# Patient Record
Sex: FEMALE | Race: WHITE | Employment: OTHER | URBAN - METROPOLITAN AREA
[De-identification: names, ages, dates, MRNs, and addresses within clinical notes are randomized per-mention and may not be internally consistent; named-entity substitution may affect disease eponyms.]

---

## 2017-06-15 ENCOUNTER — LAB REQUISITION (OUTPATIENT)
Dept: LAB | Facility: HOSPITAL | Age: 82
End: 2017-06-15
Attending: FAMILY MEDICINE
Payer: MEDICARE

## 2017-06-15 DIAGNOSIS — Z00.00 ENCOUNTER FOR GENERAL ADULT MEDICAL EXAMINATION WITHOUT ABNORMAL FINDINGS: ICD-10-CM

## 2017-06-15 PROCEDURE — 85025 COMPLETE CBC W/AUTO DIFF WBC: CPT | Performed by: FAMILY MEDICINE

## 2017-06-15 PROCEDURE — 80053 COMPREHEN METABOLIC PANEL: CPT | Performed by: FAMILY MEDICINE

## 2017-06-16 ENCOUNTER — APPOINTMENT (OUTPATIENT)
Dept: CT IMAGING | Facility: HOSPITAL | Age: 82
DRG: 389 | End: 2017-06-16
Attending: STUDENT IN AN ORGANIZED HEALTH CARE EDUCATION/TRAINING PROGRAM
Payer: MEDICARE

## 2017-06-16 ENCOUNTER — HOSPITAL ENCOUNTER (INPATIENT)
Facility: HOSPITAL | Age: 82
LOS: 8 days | Discharge: NURSING, REHAB OR LONG-TERM CARE | DRG: 389 | End: 2017-06-24
Attending: STUDENT IN AN ORGANIZED HEALTH CARE EDUCATION/TRAINING PROGRAM | Admitting: HOSPITALIST
Payer: MEDICARE

## 2017-06-16 ENCOUNTER — APPOINTMENT (OUTPATIENT)
Dept: GENERAL RADIOLOGY | Facility: HOSPITAL | Age: 82
DRG: 389 | End: 2017-06-16
Attending: STUDENT IN AN ORGANIZED HEALTH CARE EDUCATION/TRAINING PROGRAM
Payer: MEDICARE

## 2017-06-16 DIAGNOSIS — K56.609 SBO (SMALL BOWEL OBSTRUCTION) (HCC): Primary | ICD-10-CM

## 2017-06-16 DIAGNOSIS — E87.1 HYPONATREMIA: ICD-10-CM

## 2017-06-16 DIAGNOSIS — J44.9 CHRONIC OBSTRUCTIVE PULMONARY DISEASE, UNSPECIFIED COPD TYPE (HCC): ICD-10-CM

## 2017-06-16 DIAGNOSIS — R11.2 NAUSEA AND VOMITING IN ADULT: ICD-10-CM

## 2017-06-16 PROCEDURE — 96361 HYDRATE IV INFUSION ADD-ON: CPT

## 2017-06-16 PROCEDURE — 82962 GLUCOSE BLOOD TEST: CPT

## 2017-06-16 PROCEDURE — 83036 HEMOGLOBIN GLYCOSYLATED A1C: CPT | Performed by: PHYSICIAN ASSISTANT

## 2017-06-16 PROCEDURE — 85025 COMPLETE CBC W/AUTO DIFF WBC: CPT | Performed by: HOSPITALIST

## 2017-06-16 PROCEDURE — 83735 ASSAY OF MAGNESIUM: CPT | Performed by: HOSPITALIST

## 2017-06-16 PROCEDURE — 74176 CT ABD & PELVIS W/O CONTRAST: CPT | Performed by: STUDENT IN AN ORGANIZED HEALTH CARE EDUCATION/TRAINING PROGRAM

## 2017-06-16 PROCEDURE — 84145 PROCALCITONIN (PCT): CPT | Performed by: HOSPITALIST

## 2017-06-16 PROCEDURE — 83935 ASSAY OF URINE OSMOLALITY: CPT | Performed by: INTERNAL MEDICINE

## 2017-06-16 PROCEDURE — 97530 THERAPEUTIC ACTIVITIES: CPT

## 2017-06-16 PROCEDURE — 82570 ASSAY OF URINE CREATININE: CPT | Performed by: INTERNAL MEDICINE

## 2017-06-16 PROCEDURE — 99285 EMERGENCY DEPT VISIT HI MDM: CPT

## 2017-06-16 PROCEDURE — 94640 AIRWAY INHALATION TREATMENT: CPT

## 2017-06-16 PROCEDURE — 97530 THERAPEUTIC ACTIVITIES: CPT | Performed by: PHYSICAL THERAPIST

## 2017-06-16 PROCEDURE — 80053 COMPREHEN METABOLIC PANEL: CPT | Performed by: STUDENT IN AN ORGANIZED HEALTH CARE EDUCATION/TRAINING PROGRAM

## 2017-06-16 PROCEDURE — 80053 COMPREHEN METABOLIC PANEL: CPT | Performed by: INTERNAL MEDICINE

## 2017-06-16 PROCEDURE — 84443 ASSAY THYROID STIM HORMONE: CPT | Performed by: INTERNAL MEDICINE

## 2017-06-16 PROCEDURE — 93005 ELECTROCARDIOGRAM TRACING: CPT

## 2017-06-16 PROCEDURE — 97162 PT EVAL MOD COMPLEX 30 MIN: CPT | Performed by: PHYSICAL THERAPIST

## 2017-06-16 PROCEDURE — 85025 COMPLETE CBC W/AUTO DIFF WBC: CPT | Performed by: STUDENT IN AN ORGANIZED HEALTH CARE EDUCATION/TRAINING PROGRAM

## 2017-06-16 PROCEDURE — 93010 ELECTROCARDIOGRAM REPORT: CPT

## 2017-06-16 PROCEDURE — 71010 XR CHEST AP PORTABLE  (CPT=71010): CPT | Performed by: STUDENT IN AN ORGANIZED HEALTH CARE EDUCATION/TRAINING PROGRAM

## 2017-06-16 PROCEDURE — 84484 ASSAY OF TROPONIN QUANT: CPT | Performed by: STUDENT IN AN ORGANIZED HEALTH CARE EDUCATION/TRAINING PROGRAM

## 2017-06-16 PROCEDURE — 96374 THER/PROPH/DIAG INJ IV PUSH: CPT

## 2017-06-16 PROCEDURE — 84100 ASSAY OF PHOSPHORUS: CPT | Performed by: INTERNAL MEDICINE

## 2017-06-16 PROCEDURE — 84550 ASSAY OF BLOOD/URIC ACID: CPT | Performed by: INTERNAL MEDICINE

## 2017-06-16 PROCEDURE — 84133 ASSAY OF URINE POTASSIUM: CPT | Performed by: INTERNAL MEDICINE

## 2017-06-16 PROCEDURE — 97165 OT EVAL LOW COMPLEX 30 MIN: CPT

## 2017-06-16 PROCEDURE — 83930 ASSAY OF BLOOD OSMOLALITY: CPT | Performed by: INTERNAL MEDICINE

## 2017-06-16 PROCEDURE — 83735 ASSAY OF MAGNESIUM: CPT | Performed by: STUDENT IN AN ORGANIZED HEALTH CARE EDUCATION/TRAINING PROGRAM

## 2017-06-16 PROCEDURE — 84300 ASSAY OF URINE SODIUM: CPT | Performed by: INTERNAL MEDICINE

## 2017-06-16 PROCEDURE — 94644 CONT INHLJ TX 1ST HOUR: CPT

## 2017-06-16 PROCEDURE — 84100 ASSAY OF PHOSPHORUS: CPT | Performed by: STUDENT IN AN ORGANIZED HEALTH CARE EDUCATION/TRAINING PROGRAM

## 2017-06-16 PROCEDURE — 71020 XR CHEST PA + LAT CHEST (CPT=71020): CPT | Performed by: STUDENT IN AN ORGANIZED HEALTH CARE EDUCATION/TRAINING PROGRAM

## 2017-06-16 PROCEDURE — 83690 ASSAY OF LIPASE: CPT | Performed by: STUDENT IN AN ORGANIZED HEALTH CARE EDUCATION/TRAINING PROGRAM

## 2017-06-16 RX ORDER — IPRATROPIUM BROMIDE AND ALBUTEROL SULFATE 2.5; .5 MG/3ML; MG/3ML
3 SOLUTION RESPIRATORY (INHALATION) ONCE
Status: COMPLETED | OUTPATIENT
Start: 2017-06-16 | End: 2017-06-16

## 2017-06-16 RX ORDER — MORPHINE SULFATE 2 MG/ML
1 INJECTION, SOLUTION INTRAMUSCULAR; INTRAVENOUS EVERY 2 HOUR PRN
Status: DISCONTINUED | OUTPATIENT
Start: 2017-06-16 | End: 2017-06-24

## 2017-06-16 RX ORDER — FUROSEMIDE 40 MG/1
40 TABLET ORAL 2 TIMES DAILY
Status: ON HOLD | COMMUNITY
End: 2017-06-24

## 2017-06-16 RX ORDER — IPRATROPIUM BROMIDE AND ALBUTEROL SULFATE 2.5; .5 MG/3ML; MG/3ML
3 SOLUTION RESPIRATORY (INHALATION) EVERY 4 HOURS PRN
Status: DISCONTINUED | OUTPATIENT
Start: 2017-06-16 | End: 2017-06-24

## 2017-06-16 RX ORDER — MORPHINE SULFATE 2 MG/ML
2 INJECTION, SOLUTION INTRAMUSCULAR; INTRAVENOUS EVERY 2 HOUR PRN
Status: DISCONTINUED | OUTPATIENT
Start: 2017-06-16 | End: 2017-06-24

## 2017-06-16 RX ORDER — INSULIN ASPART 100 [IU]/ML
8 INJECTION, SOLUTION INTRAVENOUS; SUBCUTANEOUS 3 TIMES DAILY
Status: ON HOLD | COMMUNITY
End: 2017-06-24

## 2017-06-16 RX ORDER — ACETAMINOPHEN 325 MG/1
650 TABLET ORAL EVERY 6 HOURS PRN
COMMUNITY

## 2017-06-16 RX ORDER — METHYLPREDNISOLONE SODIUM SUCCINATE 125 MG/2ML
125 INJECTION, POWDER, LYOPHILIZED, FOR SOLUTION INTRAMUSCULAR; INTRAVENOUS ONCE
Status: COMPLETED | OUTPATIENT
Start: 2017-06-16 | End: 2017-06-16

## 2017-06-16 RX ORDER — ONDANSETRON 2 MG/ML
4 INJECTION INTRAMUSCULAR; INTRAVENOUS EVERY 6 HOURS PRN
Status: DISCONTINUED | OUTPATIENT
Start: 2017-06-16 | End: 2017-06-24

## 2017-06-16 RX ORDER — BRIMONIDINE TARTRATE 2 MG/ML
1 SOLUTION/ DROPS OPHTHALMIC 2 TIMES DAILY
Status: DISCONTINUED | OUTPATIENT
Start: 2017-06-16 | End: 2017-06-24

## 2017-06-16 RX ORDER — DEXTROSE MONOHYDRATE 25 G/50ML
50 INJECTION, SOLUTION INTRAVENOUS
Status: DISCONTINUED | OUTPATIENT
Start: 2017-06-16 | End: 2017-06-24

## 2017-06-16 RX ORDER — HEPARIN SODIUM 5000 [USP'U]/ML
5000 INJECTION, SOLUTION INTRAVENOUS; SUBCUTANEOUS EVERY 8 HOURS SCHEDULED
Status: DISCONTINUED | OUTPATIENT
Start: 2017-06-16 | End: 2017-06-24

## 2017-06-16 RX ORDER — LOPERAMIDE HYDROCHLORIDE 2 MG/1
2 CAPSULE ORAL 4 TIMES DAILY PRN
Status: ON HOLD | COMMUNITY
End: 2017-06-24

## 2017-06-16 RX ORDER — SODIUM CHLORIDE 9 MG/ML
INJECTION, SOLUTION INTRAVENOUS CONTINUOUS
Status: ACTIVE | OUTPATIENT
Start: 2017-06-16 | End: 2017-06-17

## 2017-06-16 RX ORDER — SODIUM CHLORIDE 9 MG/ML
INJECTION, SOLUTION INTRAVENOUS CONTINUOUS
Status: DISCONTINUED | OUTPATIENT
Start: 2017-06-16 | End: 2017-06-18

## 2017-06-16 RX ORDER — IPRATROPIUM BROMIDE AND ALBUTEROL SULFATE 2.5; .5 MG/3ML; MG/3ML
SOLUTION RESPIRATORY (INHALATION)
Status: DISPENSED
Start: 2017-06-16 | End: 2017-06-16

## 2017-06-16 RX ORDER — SODIUM CHLORIDE 9 MG/ML
1000 INJECTION, SOLUTION INTRAVENOUS ONCE
Status: COMPLETED | OUTPATIENT
Start: 2017-06-16 | End: 2017-06-16

## 2017-06-16 RX ORDER — ONDANSETRON 2 MG/ML
4 INJECTION INTRAMUSCULAR; INTRAVENOUS EVERY 4 HOURS PRN
Status: DISCONTINUED | OUTPATIENT
Start: 2017-06-16 | End: 2017-06-16

## 2017-06-16 RX ORDER — ATORVASTATIN CALCIUM 10 MG/1
10 TABLET, FILM COATED ORAL NIGHTLY
Status: ON HOLD | COMMUNITY
End: 2017-06-24

## 2017-06-16 RX ORDER — MORPHINE SULFATE 4 MG/ML
4 INJECTION, SOLUTION INTRAMUSCULAR; INTRAVENOUS EVERY 2 HOUR PRN
Status: DISCONTINUED | OUTPATIENT
Start: 2017-06-16 | End: 2017-06-24

## 2017-06-16 NOTE — CONSULTS
[facility] - Nephrology  Report of Consultation    Gerard Parviz Patient Status:  Inpatient    1934 MRN [id number]   [facility] 5NW-A Attending Doni Hastings, [address] [facility] Day # 0 PCP Yeimi Degroot MD     Reason for cons HISTORY  2003    Comment right breast lumpectomy    COLONOSCOPY  4/28/11= Polyps (TA)    Comment Repeat PRN.       RADIATION RIGHT  1992    LUMPECTOMY RIGHT  1992     Family History   Problem Relation Age of Onset   • Heart Attack Mother       reports that furosemide 40 MG Oral Tab Take 40 mg by mouth 2 (two) times daily. Disp:  Rfl:     atorvastatin 10 MG Oral Tab Take 10 mg by mouth nightly. Disp:  Rfl:     acetaminophen 325 MG Oral Tab Take 650 mg by mouth every 6 (six) hours as needed for Pain.  Disp: Disp: 30 capsule Rfl: 0 Taking   aspirin 81 MG Oral Tab Take 81 mg by mouth daily. Indications: Heart Attack Disp:  Rfl:  6/15/2017 at 0800   brimonidine Tartrate (ALPHAGAN) 0.2 % Ophthalmic Solution Place 1 drop into both eyes 2 (two) times daily.  Disp: 06/16/2017   ALB 3.2* 06/16/2017   * 06/16/2017   * 06/16/2017   K 3.9 06/16/2017   K 3.9 06/16/2017   CL 78* 06/16/2017   CL 78* 06/16/2017   CO2 23.0 06/16/2017   CO2 23.0 06/16/2017       Lab Results  Component Value Date   WBC 8.5 06/16/201 depletion. IVF     pSBO - mgmt as per surgery     HTN - hold BP meds, running lower than her normal at present     [facility] - judicious use of IVF and will avoid being overly aggressive.  Compensated at present and dry     Juliocesar Richards DO  [facility]

## 2017-06-16 NOTE — PROGRESS NOTES
NURSING ADMISSION NOTE      Patient admitted via Cart  Oriented to room. Safety precautions initiated. Bed in low position. Call light in reach. Admission navigator complete. Pt aox2, maintaining O2 sats on RA, wheezy. VSS. Denies pain.  NG tub

## 2017-06-16 NOTE — H&P
[address] [facility] Patient Status:  Inpatient    1934 MRN [id number]   [facility] 5NW-A Attending Santo Cline, DO   Hosp Day # 0 PCP Yin Noriega MD     Cc: vomiting    History of Present [place] OTHER SURGICAL HISTORY  2003    Comment right breast lumpectomy    COLONOSCOPY  4/28/11= Polyps (TA)    Comment Repeat PRN.       RADIATION RIGHT  1992    LUMPECTOMY RIGHT  1992     Family History   Problem Relation Age of Onset   • Heart Attack Mother FLEXPEN) 100 UNIT/ML Subcutaneous Solution Pen-injector Inject 2 Units into the skin 3 (three) times daily before meals. (Patient taking differently: Inject 2 Units into the skin 3 (three) times daily before meals.  SLIDING SCALE    150-200=2 units, 201-250 comfortable  HEENT: moist mucous membranes.  PERRL  Lungs: clear to auscultation bilaterally, no wheeze  Heart:  regular rate and rhythm, no murmur   Abdomen: soft, nontender to palpation,  bowel sounds hypokinetic  Extremities: no calf tenderness, no pedal 2.7 x 2.3 cm. This overall appearance could be related to chronic pancreatitis with multiple pseudocysts or cystic pancreatic neoplasm is not entirely excluded. Clinical correlation recommended.   3. Multiple stable chronic compression deformities in the sp CAD, T2DM, breast ca, HTN, HL and recent admission for SBO and possible Gram neg PNA in Aug 2016 per my review of records who is admitted with complaint of sudden onset of vomiting and inability to keep any PO since yesterday.   She denies abd pain or cramp

## 2017-06-16 NOTE — PAYOR COMM NOTE
--------------  ADMISSION REVIEW     Payor: [facility] MEDICARE ADV PPO  Subscriber #:  [id number]  Authorization Number: N/A    Admit date: 6/16/2017  1:32 AM       Admitting Physician: Kings Orr MD  Attending Physician:  Mckenzie Mckeon DO  Primary Care P mmHg  Pulse 100  Temp(Src) 97.2 °F (36.2 °C)  Resp 22  Ht 147.3 cm (4' 10\")  Wt 49.896 kg  BMI 23.00 kg/m2  SpO2 100%        Physical Exam    Constitutional: oriented to person, place, and time, appears well-developed and well-nourished.    HENT:   Head: N improvement. Patient's significant hyponatremia in the absence of neurologic changes was addressed with normal saline IV fluid bolus with 1 L followed by infusion of 100 mL's per hour. Patient was kept n.p.o. pending further evaluation.     Abdominal pa    pancreatitis with multiple pseudocysts or cystic pancreatic neoplasm is not entirely excluded. Clinical correlation recommended.      3.  Multiple stable chronic compression deformities in the spine                    MEDICATIONS ADMINISTERED IN LAST 1 D time.    NG tube, bowel rest, IV fluids, IV pain medication prn  Continue with serial abdominal exams, if patients condition worsens will proceed with surgical management.     Obstructive series in the morning tomorrow.

## 2017-06-16 NOTE — CM/SW NOTE
Admitted from [facility], appears to be a LTC resident. Left voicemil for pt's daughter, Veena Falcon [phone], requesting call back to confirm dc plan will be to return at [place].     Ray Mak RN/CM  [phone]

## 2017-06-16 NOTE — PLAN OF CARE
Impaired Activities of Daily Living    • Achieve highest/safest level of independence in self care Progressing        Impaired Functional Mobility    • Achieve highest/safest level of mobility/gait Progressing        Patient/Family Goals    • Patient/Famil aortic atherosclerosis (HCC)     DM (diabetes mellitus), type 2, uncontrolled ([address]     BOB (dyspnea on exertion)     Kyphosis of thoracic region, unspecified kyphosis type     Compression fracture of fourth lumbar vertebra with delayed healing     Darrick

## 2017-06-16 NOTE — CONSULTS
[facility]  Report of Consultation    Ladarius Perez Patient Status:  Inpatient    1934 MRN [id number]   [facility] 5NW-A Attending Santo Cline, [address] [facility] Day # 0 PCP Yin Noriega MD     Reason for Consultation:    Iris Carmona TONSILLECTOMY      Comment as child    OTHER SURGICAL HISTORY  2003    Comment colon resection    OTHER SURGICAL HISTORY  2003    Comment lymph node exc    OTHER SURGICAL HISTORY  1990's    Comment cardiac stents    OTHER SURGICAL HISTORY  2003    Comment medications on file prior to encounter. Current Outpatient Prescriptions on File Prior to Encounter:  Losartan Potassium 25 MG Oral Tab Take 2 tablets (50 mg total) by mouth daily.  Disp: 30 tablet Rfl: 0   ipratropium-albuterol 0.5-2.5 (3) MG/3ML Inhalati Strip CHECK GLUCOSE THREE TIMES DAILY, DX E11.9 Disp: 100 strip Rfl: 0   Escitalopram Oxalate (LEXAPRO) 10 MG Oral Tab Take 10 mg by mouth daily.  Disp:  Rfl:    Insulin Pen Needle (NOVOFINE) 30G X 8 MM Does not apply Misc Inject three times daily as direct vomiting  TECHNIQUE:  Unenhanced multislice CT scanning was performed from the dome of the diaphragm to the pubic symphysis. Dose reduction techniques were used.  Dose information is transmitted to the [facility] [employer] of Radiology) [facility] Estes [facility] or pelvis BONES:  Osteopenia. Degenerative changes in the spine, sacral joints, and hips. Old fracture deformities of the left pubic rami.  Changes of previous vertebroplasty is with extensive chronic appearing compression deformi anemia     History of colon cancer     Stasis dermatitis of both legs     Bilateral edema of lower extremity     Diabetic polyneuropathy associated with type 2 diabetes mellitus (HCC)     Smoking     Lung nodules     Compression fracture of body of thoraci

## 2017-06-16 NOTE — ED INITIAL ASSESSMENT (HPI)
Pt coming from [facility] in [place]. Pt vomited 3 times today. Last emesis at 2030. Labs drawn this event. Nursing home reports low sodium of 115. Pt A&OX2 which is pt's baseline.

## 2017-06-16 NOTE — ED PROVIDER NOTES
Patient Seen in: [facility] Emergency Department    History   Patient presents with:  Nausea/Vomiting/Diarrhea (gastrointestinal)  Abnormal Result (metabolic, cardiac)    Stated Complaint:     HPI    Patient is an 80-year-old female nursing home res Medications :   Losartan Potassium 25 MG Oral Tab,  Take 2 tablets (50 mg total) by mouth daily. ipratropium-albuterol 0.5-2.5 (3) MG/3ML Inhalation Solution,  Take 3 mL by nebulization.    acetaminophen 325 MG Oral Tab,  Take 650 mg by mouth every 0.50  Years: 64      Smokeless Status: Never Used                        Alcohol Use: No                Review of Systems    Positive for stated complaint:   Other systems are as noted in HPI. Constitutional and vital signs reviewed.       All other system following:     Lipase 38 (*)     All other components within normal limits   CBC W/ DIFFERENTIAL - Abnormal; Notable for the following:     Neutrophil Absolute Prelim 7.52 (*)     Neutrophil Absolute 7.52 (*)     Lymphocyte Absolute 0.33 (*)     All other performed. Patient's wheezing continued to worsen despite receiving 3 DuoNeb treatments, therefore continuous albuterol 10 mg was administered along with steroids.     Case was also discussed in detail with the admitting physician, Dr. Nancy Fernandez, who agre

## 2017-06-16 NOTE — OCCUPATIONAL THERAPY NOTE
OCCUPATIONAL THERAPY QUICK EVALUATION - INPATIENT    Room Number: 475/006-R  Evaluation Date: 6/16/2017     Type of Evaluation: Quick Eval  Presenting Problem: SBO, hyponatremia, N/V and COPD     Physician Order: IP Consult to Occupational Therapy  Reason right breast lumpectomy    COLONOSCOPY  4/28/11= Polyps (TA)    Comment Repeat PRN.       RADIATION RIGHT  1992    LUMPECTOMY RIGHT  1992       HOME SITUATION  Type of Home: Skilled nursing facility [facility] )  Home Layout: One level  Lives With: assistance  Sit to Stand: Maximum assistance (minx1, modx1 )    Skilled Therapy Provided: Pt was found in bed in a semi-supine position. Pt required total A for doffing and donning socks. Pt performed supine>sit EOB with max assistance.  Pt performed sit>st

## 2017-06-16 NOTE — PHYSICAL THERAPY NOTE
PHYSICAL THERAPY QUICK EVALUATION - INPATIENT    Room Number: 851/453-J  Evaluation Date: 6/16/2017  Presenting Problem: small bowel obstruction  Physician Order: PT Eval and Treat    History: Patient was admitted from [facility] for small bowel ob to SOB, COPD and hyponatremia. Pt indicates that she is not sure if she can walk. OBJECTIVE  Precautions:  Other (Comment) (NG to suction )  Fall Risk: High fall risk    WEIGHT BEARING RESTRICTION  Weight Bearing Restriction: None                PAIN AS maintaining good posture as pt tends to stoop forward with ambulation. Pt needs intermittent rest with ambulation. She requires chair follow up with ambulation. She was able to ambulate with maximal assistance.      Patient End of Session: Up in chair    AS

## 2017-06-17 ENCOUNTER — APPOINTMENT (OUTPATIENT)
Dept: GENERAL RADIOLOGY | Facility: HOSPITAL | Age: 82
DRG: 389 | End: 2017-06-17
Attending: HOSPITALIST
Payer: MEDICARE

## 2017-06-17 ENCOUNTER — APPOINTMENT (OUTPATIENT)
Dept: GENERAL RADIOLOGY | Facility: HOSPITAL | Age: 82
DRG: 389 | End: 2017-06-17
Attending: PHYSICIAN ASSISTANT
Payer: MEDICARE

## 2017-06-17 PROCEDURE — 87798 DETECT AGENT NOS DNA AMP: CPT | Performed by: HOSPITALIST

## 2017-06-17 PROCEDURE — 82962 GLUCOSE BLOOD TEST: CPT

## 2017-06-17 PROCEDURE — 85025 COMPLETE CBC W/AUTO DIFF WBC: CPT | Performed by: HOSPITALIST

## 2017-06-17 PROCEDURE — 80048 BASIC METABOLIC PNL TOTAL CA: CPT | Performed by: INTERNAL MEDICINE

## 2017-06-17 PROCEDURE — 87581 M.PNEUMON DNA AMP PROBE: CPT | Performed by: HOSPITALIST

## 2017-06-17 PROCEDURE — 74020 XR ABDOMEN, OBSTRUCTIVE SERIES (CPT=74020): CPT | Performed by: PHYSICIAN ASSISTANT

## 2017-06-17 PROCEDURE — 0CJS8ZZ INSPECTION OF LARYNX, VIA NATURAL OR ARTIFICIAL OPENING ENDOSCOPIC: ICD-10-PCS | Performed by: OTOLARYNGOLOGY

## 2017-06-17 PROCEDURE — 94640 AIRWAY INHALATION TREATMENT: CPT

## 2017-06-17 PROCEDURE — 87633 RESP VIRUS 12-25 TARGETS: CPT | Performed by: HOSPITALIST

## 2017-06-17 PROCEDURE — 87486 CHLMYD PNEUM DNA AMP PROBE: CPT | Performed by: HOSPITALIST

## 2017-06-17 PROCEDURE — 70360 X-RAY EXAM OF NECK: CPT | Performed by: HOSPITALIST

## 2017-06-17 PROCEDURE — 71010 XR CHEST AP PORTABLE  (CPT=71010): CPT | Performed by: HOSPITALIST

## 2017-06-17 RX ORDER — GUAIFENESIN 600 MG
600 TABLET, EXTENDED RELEASE 12 HR ORAL 2 TIMES DAILY
Status: DISCONTINUED | OUTPATIENT
Start: 2017-06-17 | End: 2017-06-24

## 2017-06-17 RX ORDER — IPRATROPIUM BROMIDE AND ALBUTEROL SULFATE 2.5; .5 MG/3ML; MG/3ML
3 SOLUTION RESPIRATORY (INHALATION)
Status: DISCONTINUED | OUTPATIENT
Start: 2017-06-17 | End: 2017-06-18

## 2017-06-17 RX ORDER — POTASSIUM CHLORIDE 20 MEQ/1
40 TABLET, EXTENDED RELEASE ORAL ONCE
Status: COMPLETED | OUTPATIENT
Start: 2017-06-17 | End: 2017-06-17

## 2017-06-17 RX ORDER — METHYLPREDNISOLONE SODIUM SUCCINATE 125 MG/2ML
60 INJECTION, POWDER, LYOPHILIZED, FOR SOLUTION INTRAMUSCULAR; INTRAVENOUS EVERY 8 HOURS
Status: DISCONTINUED | OUTPATIENT
Start: 2017-06-17 | End: 2017-06-17

## 2017-06-17 RX ORDER — LORAZEPAM 2 MG/ML
0.5 INJECTION INTRAMUSCULAR ONCE
Status: COMPLETED | OUTPATIENT
Start: 2017-06-17 | End: 2017-06-17

## 2017-06-17 NOTE — PLAN OF CARE
Impaired Activities of Daily Living    • Achieve highest/safest level of independence in self care Progressing        Impaired Functional Mobility    • Achieve highest/safest level of mobility/gait Progressing        Impaired Functional Mobility    • Achie

## 2017-06-17 NOTE — PROGRESS NOTES
[facility] Hospitalist Progress Note     PCP: Bernabe Orr MD    CC:  Follow up    SUBJECTIVE:  Pt sitting up in chair, son at bedside. Overnight, pt with episode of wet sounding cough-- CXR without infectious process. NG pulled overnight.       Pt repo 78*  78*  84*   CO2  27.0  28.0  23.0  23.0  29.0   BUN  16  16  16  16  17   CREATSERUM  1.31*  1.36*  1.50*  1.50*  1.24*   CA  9.0  8.8  8.0*  8.0*  8.0*   MG   --   1.7  1.7   --    PHOS   --    --   3.2   --    GLU  225*  205*  302*  302*  153* steroids  -mucinex, RVP ordered  -IS, pulmonary toilet, flutter valve    **leukocytosis-suspect reactive    **anemia- suspect reactive and secondary to CKD, monitor    # T2DM with associated peripheral neuropathy  - Ha1c of 6.0% on 4/15--> re check  - hold

## 2017-06-17 NOTE — PLAN OF CARE
Radiology called and said that chest xray looked clear, but the xray of her neck showed severe kyphosis and that the way the NG tube is sitting due to her kyphosis is causing her trachea to be narrowed.  He suggested that the NG tube be removed or that if s

## 2017-06-17 NOTE — PROGRESS NOTES
[address]  Nephrology Progress Note    Emanuel Lebron Attending:  Damon Oshea, *       SUBJECTIVE:     Overall better - NG is out   Taking in clears   Issues with stridor today   Na improving     PHYSICAL EXAM:     Vital Signs: /76 06/17/2017       Lab Results  Component Value Date   CREUR 64.60 06/16/2017       Lab Results  Component Value Date   COLORUR Yellow 08/07/2016   CLARITY Hazy* 08/07/2016   SPECGRAVITY 1.021 08/07/2016   GLUUR >=500* 08/07/2016   BILUR Negative 08/07/2016 (LEVEMIR) 100 UNIT/ML flextouch 5 Units 5 Units Subcutaneous Daily   brimonidine Tartrate (ALPHAGAN) 0.2 % ophthalmic solution 1 drop 1 drop Both Eyes BID       ASSESSMENT/PLAN:     Alesha Mcmullen is a 80year old female with past medical history signifi

## 2017-06-17 NOTE — PLAN OF CARE
At 2300 RN paged respiratory for breathing treatment. Patient's cough progressed from wet sounding to a \"barking\" sound with wheezing throughout. Breathing treatment administered but there was no improvement.  Dr. Aguero Filter notified and she gave orders for R

## 2017-06-17 NOTE — PLAN OF CARE
Received patient a/ox4. C/o of frequent cough. Cough is very wet sounding but patient unable to cough anything up. Notified Dr. Kamilah Dupree. She ordered stat procalcitonin, incentive spirometry and CBC for the morning.  RN to call her back tonight of procal is e

## 2017-06-17 NOTE — CONSULTS
[facility] PULMONARY/CRITICAL CARE CONSULTATION    HPI: Alexandria Moser is a 80year old female with no hx of lung disease who presented to the ED with N/V and abd pain. The patient was dx with a small bowel obs and was being treated with NGT decompression.   Paula Patel Oral Tab   Yes No   Sig: Take 10 mg by mouth daily. Insulin Aspart (NOVOLOG FLEXPEN) 100 UNIT/ML Subcutaneous Solution Pen-injector   No Yes   Sig: Inject 2 Units into the skin 3 (three) times daily before meals.    Patient taking differently: Inject 2 Un Take 10 mg by mouth nightly. brimonidine Tartrate (ALPHAGAN) 0.2 % Ophthalmic Solution 6/15/2017 at Unknown time  Yes Yes   Sig: Place 1 drop into both eyes 2 (two) times daily.    furosemide 40 MG Oral Tab   Yes Yes   Sig: Take 40 mg by mouth 2 (two) linda Intravenous Q6H PRN   ipratropium-albuterol (DUONEB) nebulizer solution 3 mL 3 mL Nebulization Q4H PRN   insulin aspart (NOVOLOG) 100 UNIT/ML flexpen 1-5 Units 1-5 Units Subcutaneous 4 times per day   insulin detemir (LEVEMIR) 100 UNIT/ML flextouch 5 Units symmetric  Neurologic: Grossly normal    Labs:  Recent Labs   Lab  06/16/17   0141  06/16/17   0524  06/17/17   0528   RBC  4.33  3.87  3.56*   HGB  12.8  11.3*  10.6*   HCT  35.7  32.6*  30.8*   MCV  82.4  84.2  86.5   MCH  29.6  29.2  29.8   MCHC  35.9

## 2017-06-17 NOTE — CONSULTS
Physician Requesting Consult: Marlene Mcrae  Primary Physician:     Ubaldo Otto  Date of Onset:                CC: Patient presents with:  Nausea/Vomiting/Diarrhea (gastrointestinal)  Abnormal Result (metabolic, cardiac)    HPI: Jayson Shanks 80year old female wit exercises. Findings: B TVC motion intact and glottic closure complete. NO Laryngeal lesions or obstructing masses. Mild posterior commisure edema. Upper trachea evaluated and no stenosis or lesions noted.    DIAGNOSTIC TESTING  Audiology: None ordered

## 2017-06-17 NOTE — PLAN OF CARE
Patient currently sleeping after NG pulled and Ativan given. O2 sat on RA 99%. Breathing is comfortable and non-labored. Other vital signs remain stable.

## 2017-06-18 ENCOUNTER — APPOINTMENT (OUTPATIENT)
Dept: GENERAL RADIOLOGY | Facility: HOSPITAL | Age: 82
DRG: 389 | End: 2017-06-18
Attending: HOSPITALIST
Payer: MEDICARE

## 2017-06-18 PROCEDURE — 82962 GLUCOSE BLOOD TEST: CPT

## 2017-06-18 PROCEDURE — 94640 AIRWAY INHALATION TREATMENT: CPT

## 2017-06-18 PROCEDURE — 71010 XR CHEST AP PORTABLE  (CPT=71010): CPT | Performed by: HOSPITALIST

## 2017-06-18 PROCEDURE — 85027 COMPLETE CBC AUTOMATED: CPT | Performed by: INTERNAL MEDICINE

## 2017-06-18 PROCEDURE — 82570 ASSAY OF URINE CREATININE: CPT | Performed by: INTERNAL MEDICINE

## 2017-06-18 PROCEDURE — 83935 ASSAY OF URINE OSMOLALITY: CPT | Performed by: INTERNAL MEDICINE

## 2017-06-18 PROCEDURE — 84300 ASSAY OF URINE SODIUM: CPT | Performed by: INTERNAL MEDICINE

## 2017-06-18 PROCEDURE — 94667 MNPJ CHEST WALL 1ST: CPT

## 2017-06-18 PROCEDURE — 80048 BASIC METABOLIC PNL TOTAL CA: CPT | Performed by: INTERNAL MEDICINE

## 2017-06-18 RX ORDER — FUROSEMIDE 10 MG/ML
10 INJECTION INTRAMUSCULAR; INTRAVENOUS ONCE
Status: DISCONTINUED | OUTPATIENT
Start: 2017-06-18 | End: 2017-06-18

## 2017-06-18 RX ORDER — ACETAMINOPHEN 325 MG/1
650 TABLET ORAL EVERY 6 HOURS PRN
Status: DISCONTINUED | OUTPATIENT
Start: 2017-06-18 | End: 2017-06-24

## 2017-06-18 RX ORDER — SODIUM CHLORIDE 1000 MG
1 TABLET, SOLUBLE MISCELLANEOUS
Status: DISCONTINUED | OUTPATIENT
Start: 2017-06-18 | End: 2017-06-19

## 2017-06-18 RX ORDER — LOSARTAN POTASSIUM 50 MG/1
50 TABLET ORAL DAILY
Status: DISCONTINUED | OUTPATIENT
Start: 2017-06-18 | End: 2017-06-24

## 2017-06-18 RX ORDER — FUROSEMIDE 10 MG/ML
20 INJECTION INTRAMUSCULAR; INTRAVENOUS ONCE
Status: COMPLETED | OUTPATIENT
Start: 2017-06-18 | End: 2017-06-18

## 2017-06-18 RX ORDER — IPRATROPIUM BROMIDE AND ALBUTEROL SULFATE 2.5; .5 MG/3ML; MG/3ML
3 SOLUTION RESPIRATORY (INHALATION) EVERY 4 HOURS PRN
Status: DISCONTINUED | OUTPATIENT
Start: 2017-06-18 | End: 2017-06-18

## 2017-06-18 RX ORDER — BISACODYL 10 MG
10 SUPPOSITORY, RECTAL RECTAL
Status: DISCONTINUED | OUTPATIENT
Start: 2017-06-18 | End: 2017-06-24

## 2017-06-18 RX ORDER — IPRATROPIUM BROMIDE AND ALBUTEROL SULFATE 2.5; .5 MG/3ML; MG/3ML
3 SOLUTION RESPIRATORY (INHALATION)
Status: DISCONTINUED | OUTPATIENT
Start: 2017-06-18 | End: 2017-06-18

## 2017-06-18 NOTE — PROGRESS NOTES
[facility] Hospitalist Progress Note     PCP: Fabiola Mcdaniel MD    CC:  Follow up    SUBJECTIVE:  Pt sitting up in bed, says she feels her breathing is labored since this AM.  Says started after coughing. No f/c.   Says has some upper L rib pain upon co 8. 0*  7.6*  7.6*   MG  1.7  1.7   --    --    --    PHOS   --   3.2   --    --    --    GLU  205*  302*  302*  153*  164*  99       Recent Labs   Lab  06/15/17   2200  06/16/17   0141  06/16/17   0524   ALT  20  21   --    AST  29  32   --    ALB  3.5  3.3 side effects / sedation  - obstructive series pending    # Hyponatremia  - significant at 115 in ER-->now 119  - renal consulted, appreciate  - renal checking urine lytes / osmo  -lasix ordered per renal    # CHANDANA / CKD  - suspect hypovolemic in setting of

## 2017-06-18 NOTE — PROGRESS NOTES
Notes reviewed    No abd pain   Small amout of flatus    abd soft slight distended  Non tender    Plan clear liquid diet    acosta supp

## 2017-06-18 NOTE — PROGRESS NOTES
[facility] PULMONARY/CRITICAL CARE    S: No new events overnight. Upper airwya noises improved from yesterday.     Meds:  • furosemide  20 mg Intravenous Once   • sodium chloride  1 g Oral TID CC   • GuaiFENesin ER  600 mg Oral BID   • Heparin Sodium (Porcine)  5 14.5*  14.3*   PLT  301.0  265.0  257.0  288.0     Recent Labs   Lab  06/15/17   2200  06/16/17   0141  06/16/17   0524  06/16/17   1802  06/17/17   1045  06/18/17   0559   GLU  225*  205*  302*  302*  153*  164*  99   BUN  16  16  16  16  17  13  9   CREA

## 2017-06-18 NOTE — PROGRESS NOTES
[address]  Nephrology Progress Note    Chick Landau Attending:  Richard Ramos, *       SUBJECTIVE:     Overall better - NG is out   Taking in clears   Breathing better   Na worse     PHYSICAL EXAM:     Vital Signs: /92 mmHg  Pulse Value Date   CREUR 28.60 06/18/2017       Lab Results  Component Value Date   COLORUR Yellow 08/07/2016   CLARITY Hazy* 08/07/2016   SPECGRAVITY 1.021 08/07/2016   GLUUR >=500* 08/07/2016   BILUR Negative 08/07/2016   KETUR 20 * 08/07/2016   BLOODURINE Neg detemir (LEVEMIR) 100 UNIT/ML flextouch 5 Units 5 Units Subcutaneous Daily   brimonidine Tartrate (ALPHAGAN) 0.2 % ophthalmic solution 1 drop 1 drop Both Eyes BID       ASSESSMENT/PLAN:     Louise Patino is a 80year old female with past medical history

## 2017-06-18 NOTE — PLAN OF CARE
Received patient a/ox3, forgetful. No new complaints. She still has a wet sounding cough but no wheezing or stridor. She is more calm tonight. O2 sats on RA maintained >95% while sleeping. She was updated on plan of care and verbalizes understanding.  Vital

## 2017-06-19 ENCOUNTER — APPOINTMENT (OUTPATIENT)
Dept: GENERAL RADIOLOGY | Facility: HOSPITAL | Age: 82
DRG: 389 | End: 2017-06-19
Attending: PHYSICIAN ASSISTANT
Payer: MEDICARE

## 2017-06-19 PROCEDURE — 84550 ASSAY OF BLOOD/URIC ACID: CPT | Performed by: HOSPITALIST

## 2017-06-19 PROCEDURE — 82962 GLUCOSE BLOOD TEST: CPT

## 2017-06-19 PROCEDURE — 83735 ASSAY OF MAGNESIUM: CPT | Performed by: HOSPITALIST

## 2017-06-19 PROCEDURE — 80069 RENAL FUNCTION PANEL: CPT | Performed by: INTERNAL MEDICINE

## 2017-06-19 PROCEDURE — 84100 ASSAY OF PHOSPHORUS: CPT | Performed by: HOSPITALIST

## 2017-06-19 PROCEDURE — 84133 ASSAY OF URINE POTASSIUM: CPT | Performed by: HOSPITALIST

## 2017-06-19 PROCEDURE — 92610 EVALUATE SWALLOWING FUNCTION: CPT

## 2017-06-19 PROCEDURE — 84540 ASSAY OF URINE/UREA-N: CPT | Performed by: HOSPITALIST

## 2017-06-19 PROCEDURE — 74250 X-RAY XM SM INT 1CNTRST STD: CPT | Performed by: PHYSICIAN ASSISTANT

## 2017-06-19 PROCEDURE — 80048 BASIC METABOLIC PNL TOTAL CA: CPT | Performed by: INTERNAL MEDICINE

## 2017-06-19 PROCEDURE — 94640 AIRWAY INHALATION TREATMENT: CPT

## 2017-06-19 PROCEDURE — 85027 COMPLETE CBC AUTOMATED: CPT | Performed by: INTERNAL MEDICINE

## 2017-06-19 PROCEDURE — 83930 ASSAY OF BLOOD OSMOLALITY: CPT | Performed by: HOSPITALIST

## 2017-06-19 RX ORDER — FUROSEMIDE 20 MG/1
10 TABLET ORAL
Status: DISCONTINUED | OUTPATIENT
Start: 2017-06-19 | End: 2017-06-19

## 2017-06-19 RX ORDER — MAGNESIUM OXIDE 400 MG (241.3 MG MAGNESIUM) TABLET
400 TABLET ONCE
Status: COMPLETED | OUTPATIENT
Start: 2017-06-19 | End: 2017-06-19

## 2017-06-19 RX ORDER — POTASSIUM CHLORIDE 20 MEQ/1
20 TABLET, EXTENDED RELEASE ORAL ONCE
Status: COMPLETED | OUTPATIENT
Start: 2017-06-19 | End: 2017-06-19

## 2017-06-19 RX ORDER — FUROSEMIDE 20 MG/1
20 TABLET ORAL
Status: DISCONTINUED | OUTPATIENT
Start: 2017-06-19 | End: 2017-06-20

## 2017-06-19 RX ORDER — SODIUM CHLORIDE 1000 MG
2 TABLET, SOLUBLE MISCELLANEOUS
Status: DISCONTINUED | OUTPATIENT
Start: 2017-06-19 | End: 2017-06-24

## 2017-06-19 NOTE — PROGRESS NOTES
[facility]  Progress Note    Jett Fuelling Patient Status:  Inpatient    1934 MRN [id number]   [facility] 5NW-A Attending Sharyn Matias, *   Hosp Day # 3 PCP Monica Alvarado MD     Subjective:    Patient denies any atherosclerosis ([address]     DM (diabetes mellitus), type 2, uncontrolled ([address]     BOB (dyspnea on exertion)     Kyphosis of thoracic region, unspecified kyphosis type     Compression fracture of fourth lumbar vertebra with delayed healing     Compression fr

## 2017-06-19 NOTE — PROGRESS NOTES
06/19/17 6940   Provider Notification   Reason for Communication Critical value   Test/Procedure Name sodium   Provider Name Dr. Raul Marr   Method of Communication Page   Response Waiting for response   Notification Time [id number]     Dr. Raul Marr notified of crit

## 2017-06-19 NOTE — PAYOR COMM NOTE
--------------  CONTINUED STAY REVIEW    Kiersten Mcknight MEDICARE ADV PPO  Subscriber #:  [id number]  Authorization Number: [id number]    Admit date: 6/16/2017  1:32 AM       Admitting Physician: Dara Kiser MD  Attending Physician:  Koby Kelly MD  [facility] Units Subcutaneous (Left Lower Abdomen) Chantale Hernandez, RN      insulin aspart (NOVOLOG) 100 UNIT/ML flexpen 1-5 Units     Date Action Dose Route User    6/19/2017 0636 Given 2 Units Subcutaneous (Left Upper Arm) Enrico Myles RN    6/19/2017 0017 Given 3 sbo,   tolerated clears without pain, n/v.    Still with abdominal distension      Plan:     Will plan small bowel follow through today.

## 2017-06-19 NOTE — CM/SW NOTE
MSW reviewed chart, left VM for Dtr Douglas Maliks [phone] req call back to discuss d/c.    12:03 MSW spoke to [facility] from [facility] who confirms patient is a LTC resident.

## 2017-06-19 NOTE — PLAN OF CARE
Impaired Activities of Daily Living    • Achieve highest/safest level of independence in self care Progressing        Impaired Functional Mobility    • Achieve highest/safest level of mobility/gait Progressing        Impaired Swallowing    • Minimize aspir type 2, uncontrolled ([address]     BOB (dyspnea on exertion)     Kyphosis of thoracic region, unspecified kyphosis type     Compression fracture of fourth lumbar vertebra with delayed healing     Compression fracture of first lumbar vertebra with delayed heali

## 2017-06-19 NOTE — PROGRESS NOTES
[facility]  Nephrology Progress Note    Bárbara Barrera Attending:  Joanna Riojas, *       SUBJECTIVE:     Per nurse, mental status improved actually  However Na dropping  Has not been on fluid restriction and per nurse has had good fluid in MOABSO 0.90* 06/17/2017   EOABSO 0.00 06/17/2017   BAABSO 0.01 06/17/2017       Lab Results  Component Value Date   CREUR 28.60 06/18/2017       Lab Results  Component Value Date   COLORUR Yellow 08/07/2016   CLARITY Hazy* 08/07/2016   SPECGRAVITY 1.021 mg Intravenous Q6H PRN   ipratropium-albuterol (DUONEB) nebulizer solution 3 mL 3 mL Nebulization Q4H PRN   insulin aspart (NOVOLOG) 100 UNIT/ML flexpen 1-5 Units 1-5 Units Subcutaneous 4 times per day   insulin detemir (LEVEMIR) 100 UNIT/ML flextouch 5 Un

## 2017-06-19 NOTE — SLP NOTE
ADULT SWALLOWING EVALUATION    ASSESSMENT & PLAN   ASSESSMENT  B/S swallow eval completed upon receipt of MD order.   Per MD note  HPI  Patient is an 15-year-old female nursing home resident from [facility] who presents emergency department after abn po.  Clear vocal quality. No c/o globus sensation. Zapien Assessment of Swallow Function Score: No abnormality detected    PLAN   Diet Recommendations- FLD/thin liquids.  Can advance to low residue/soft when cleared by MD.  Diet Recommendations - Liquid: recall details. She denies current difficulty swallowing. Imaging Results:   Soft tissue of neck 6/17/17  Impression      CONCLUSION: Severe kyphosis of the cervical spine with narrowing at the thoracic inlet.  There is deviation of the trachea and esopha over 1-2 session(s). Goal #2 The patient/family/caregiver will demonstrate understanding and implementation of aspiration precautions and swallow strategies independently over 1-2 session(s).    Goal #3 The patient will utilize compensatory strategies as

## 2017-06-19 NOTE — PROGRESS NOTES
[facility] Hospitalist Progress Note     PCP: Shaila Nava MD    CC:  Follow up    SUBJECTIVE:  Ms. Heidi Mendoza is just back from [facility]. States her mouth and lips are dry. Tolerated CLD, denies any nausea or vomiting.  States she does not feel bloated, but st sodium chloride  2 g Oral TID CC   • furosemide  20 mg Oral BID (Diuretic)   • magnesium oxide  400 mg Oral Once   • sodium phosphate IVPB  30 mmol Intravenous Once   • Losartan Potassium  50 mg Oral Daily   • GuaiFENesin ER  600 mg Oral BID   • Heparin So peripheral neuropathy  - Ha1c of 7.9% on 6/16/17  - hold oral diabetic medications while inpatient / NPO  - accu-checks QID, sliding scale insulin as needed  - patient on 11 units insulin daily at home, dosages decreased in the setting of being NPO / limit

## 2017-06-19 NOTE — PROGRESS NOTES
Pulmonary Progress Note        NAME: Chivo Muñoz - ROOM: 164/783-Z - MRN: [id number] - Age: 80year old - : 1934        SUBJECTIVE: No events overnight    OBJECTIVE:   17  0010 17  0326 17  1344   BP: 149/87 134 --   --   --  1.8   PHOS  --   --   --   --  1.4*         Recent Labs   06/19/17  1030   ALB 3.2*       Invalid input(s): ARTERIALPH, ARTERIALPO2, ARTERIALPCO2, ARTERIALHCO3    No results for input(s): BNP in the last 72 hours.     Invalid input(s): TROPI

## 2017-06-19 NOTE — CM/SW NOTE
06/19/17 1200   CM/SW Referral Data   Referral Source Social Work (self-referral)   Reason for Referral Discharge [address]   Pertinent Medical Hx   Primary Care Physician Name [facility]   Patient Info   Patient's Mental Status Alert; Or

## 2017-06-19 NOTE — PLAN OF CARE
Problem: Impaired Swallowing  Goal: Minimize aspiration risk  Interventions:  - Patient should be alert and upright for all feedings (90 degrees preferred)  - Offer food and liquids at a slow rate  - Single sips from straw ok  - Encourage small bites of fo

## 2017-06-20 ENCOUNTER — APPOINTMENT (OUTPATIENT)
Dept: GENERAL RADIOLOGY | Facility: HOSPITAL | Age: 82
DRG: 389 | End: 2017-06-20
Attending: SURGERY
Payer: MEDICARE

## 2017-06-20 PROCEDURE — 82962 GLUCOSE BLOOD TEST: CPT

## 2017-06-20 PROCEDURE — 83735 ASSAY OF MAGNESIUM: CPT | Performed by: HOSPITALIST

## 2017-06-20 PROCEDURE — 74020 XR ABDOMEN, OBSTRUCTIVE SERIES (CPT=74020): CPT | Performed by: SURGERY

## 2017-06-20 PROCEDURE — 85027 COMPLETE CBC AUTOMATED: CPT | Performed by: INTERNAL MEDICINE

## 2017-06-20 PROCEDURE — 80069 RENAL FUNCTION PANEL: CPT | Performed by: INTERNAL MEDICINE

## 2017-06-20 RX ORDER — FUROSEMIDE 20 MG/1
10 TABLET ORAL
Status: DISCONTINUED | OUTPATIENT
Start: 2017-06-20 | End: 2017-06-24

## 2017-06-20 RX ORDER — POTASSIUM CHLORIDE 20 MEQ/1
20 TABLET, EXTENDED RELEASE ORAL ONCE
Status: DISCONTINUED | OUTPATIENT
Start: 2017-06-20 | End: 2017-06-20

## 2017-06-20 RX ORDER — POTASSIUM CHLORIDE 20 MEQ/1
40 TABLET, EXTENDED RELEASE ORAL ONCE
Status: COMPLETED | OUTPATIENT
Start: 2017-06-20 | End: 2017-06-20

## 2017-06-20 NOTE — PROGRESS NOTES
[facility] Hospitalist Progress Note     PCP: Cris Greene MD    CC:  Follow up    SUBJECTIVE:  Ms. Lexus Oneil states she feels well. Denies any chest pain or pressure. States she has been drinking water, has no appetite. Denies any nausea or vomiting.  Farley Daily   • GuaiFENesin ER  600 mg Oral BID   • Heparin Sodium (Porcine)  5,000 Units Subcutaneous Q8H [address]   • insulin aspart  1-5 Units Subcutaneous 4 times per day   • insulin detemir  5 Units Subcutaneous Daily   • brimonidine Tartrate  1 drop Both Eyes B as needed  - patient on 11 units insulin daily at home, dosages decreased in the setting of being NPO / limited po intake--> continue 5 units daily and follow glc  - expected hyperglycemia after IV steroids given, improved off steroids    # DVT Prophylaxis

## 2017-06-20 NOTE — DIETARY NOTE
NUTRITION INITIAL ASSESSMENT    Pt is at moderate nutrition risk. Pt does not meet malnutrition criteria.     NUTRITION DIAGNOSIS/PROBLEM:    Inadequate oral intake related to inability to consume sufficient energy as evidenced by NPO/clear liquid diet for No  Cultural/Ethnic/Moravian Preferences Addresses: Yes    NUTRITION RELATED PHYSICAL FINDINGS:     1. Body Fat/Muscle Mass: BMI: 21.53     2. Fluid Accumulation: None noted.     NUTRITION PRESCRIPTION: Using CBW 46.7 kg  Calories: 9318-7448 calories/day (

## 2017-06-20 NOTE — PAYOR COMM NOTE
--------------  CONTINUED STAY REVIEW    [employer] MEDICARE ADV PPO  Subscriber #:  [id number]  Authorization Number: [id number]    Admit date: 6/16/2017  1:32 AM       Admitting Physician: Cindy Roberson MD  Attending Physician:  Javier Kent MD  [facility] Given 40 mEq Oral Phyllis Castorena RN      sodium chloride tab 2 g     Date Action Dose Route User    6/20/2017 0918 Given 2 g Oral Armando Cabrera RN    6/19/2017 1648 Given 2 g Oral Shelda Tammi, RN    6/19/2017 1400 Given 2 g Oral Shelda DEN Cadena discharge.     Discharge planning to [facility]

## 2017-06-20 NOTE — PROGRESS NOTES
[facility]  Nephrology Progress Note    Jayson Shanks Attending:  Javier Berger, *       SUBJECTIVE:     No n/v. Denies pain, previously c/o back pain. Wants to eat/drink.  Na improving    PHYSICAL EXAM:     Vital Signs: /51 mmHg  Pul Results  Component Value Date   CREUR 28.60 06/18/2017       Lab Results  Component Value Date   COLORUR Yellow 08/07/2016   CLARITY Hazy* 08/07/2016   SPECGRAVITY 1.021 08/07/2016   GLUUR >=500* 08/07/2016   BILUR Negative 08/07/2016   KETUR 20 * 08/07/20 Nebulization Q4H PRN   insulin aspart (NOVOLOG) 100 UNIT/ML flexpen 1-5 Units 1-5 Units Subcutaneous 4 times per day   insulin detemir (LEVEMIR) 100 UNIT/ML flextouch 5 Units 5 Units Subcutaneous Daily   brimonidine Tartrate (ALPHAGAN) 0.2 % ophthalmic sol

## 2017-06-20 NOTE — PROGRESS NOTES
[facility]  Progress Note    Krunal Hendrickson Patient Status:  Inpatient    1934 MRN [id number]   [facility] 5NW-A Attending Yamilet Bernstein MD   Hosp Day # 4 PCP Ruth Dyson MD     Subjective:    Patient reports flatus, +BM. w/, Closed fracture of upper limb     Thoracic aorta atherosclerosis (HCC)     Aortic ectasia (HCC)     Abdominal aortic atherosclerosis (HCC)     DM (diabetes mellitus), type 2, uncontrolled ([address]     BOB (dyspnea on exertion)     Kyphosis of t

## 2017-06-20 NOTE — SLP NOTE
Pt remains NPO for SBO. ST will f/u when pt cleared for po. Yeni Schmitt M.S.,CCC-SLP  Speech Language Pathologist

## 2017-06-21 PROCEDURE — 84132 ASSAY OF SERUM POTASSIUM: CPT | Performed by: HOSPITALIST

## 2017-06-21 PROCEDURE — 85027 COMPLETE CBC AUTOMATED: CPT | Performed by: INTERNAL MEDICINE

## 2017-06-21 PROCEDURE — 80048 BASIC METABOLIC PNL TOTAL CA: CPT | Performed by: INTERNAL MEDICINE

## 2017-06-21 PROCEDURE — 82962 GLUCOSE BLOOD TEST: CPT

## 2017-06-21 PROCEDURE — 92526 ORAL FUNCTION THERAPY: CPT

## 2017-06-21 NOTE — PLAN OF CARE
Impaired Activities of Daily Living    • Achieve highest/safest level of independence in self care Progressing        Impaired Functional Mobility    • Achieve highest/safest level of mobility/gait Progressing        Impaired Functional Mobility    • Achie Lung nodules     Compression fracture of body of thoracic vertebra ([facility]     Depression     Consult 9/27/15 w/, Closed fracture of upper limb     Thoracic aorta atherosclerosis (HCC)     Aortic ectasia (HCC)     Abdominal aortic atherosclerosis

## 2017-06-21 NOTE — SLP NOTE
SPEECH DAILY NOTE - INPATIENT    Evaluation Date: 06/21/2017    ASSESSMENT & PLAN   ASSESSMENT  Pt seen for dysphagia tx to assess tolerance with recommended diet, ensure proper utilization of aspiration precautions and provide pt/family education.   No fam Comments:    Interdisciplinary Communication: Discussed with RN        GOALS  Goal #1  The patient will tolerate FLD wtih advancement to low residue consistency and thin liquids without overt signs or symptoms of aspiration with 95 % accuracy over 1-2 se

## 2017-06-21 NOTE — PROGRESS NOTES
[facility] Hospitalist Progress Note     PCP: Yeimi Degroot MD    CC:  Follow up    SUBJECTIVE:  Ms. Sabrina Erazo states she feels congested in her chest this am. Denies any chest pain or pressure. Denies cough. States she can't get phlegm up.  Has been using Losartan Potassium  50 mg Oral Daily   • GuaiFENesin ER  600 mg Oral BID   • Heparin Sodium (Porcine)  5,000 Units Subcutaneous Q8H [address]   • insulin aspart  1-5 Units Subcutaneous 4 times per day   • insulin detemir  5 Units Subcutaneous Daily   • brimonidi medications while inpatient   - accu-checks QID, sliding scale insulin as needed  - patient on 11 units insulin daily at home, dosages decreased in the setting of being NPO / limited po intake--> continue 5 units daily, glc remains controlled  - expected h

## 2017-06-21 NOTE — PROGRESS NOTES
[facility]  Nephrology Progress Note    Jossue Cardona Attending:  Roverto Dalton, *       SUBJECTIVE:     No n/v. Denies pain. Na improving.  Sitting in chair    PHYSICAL EXAM:     Vital Signs: /52 mmHg  Pulse 67  Temp(Src) 97.8 °F (36 06/18/2017       Lab Results  Component Value Date   COLORUR Yellow 08/07/2016   CLARITY Hazy* 08/07/2016   SPECGRAVITY 1.021 08/07/2016   GLUUR >=500* 08/07/2016   BILUR Negative 08/07/2016   KETUR 20 * 08/07/2016   BLOODURINE Negative 08/07/2016   PHURIN (NOVOLOG) 100 UNIT/ML flexpen 1-5 Units 1-5 Units Subcutaneous 4 times per day   insulin detemir (LEVEMIR) 100 UNIT/ML flextouch 5 Units 5 Units Subcutaneous Daily   brimonidine Tartrate (ALPHAGAN) 0.2 % ophthalmic solution 1 drop 1 drop Both Eyes BID

## 2017-06-21 NOTE — PROGRESS NOTES
[facility]  Progress Note    Merlin Silva Patient Status:  Inpatient    1934 MRN [id number]   [facility] 5NW-A Attending Rick Wen MD   Hosp Day # 5 PCP Daisy Morales MD     Subjective:    Patient up in chair, Josie Puga uncontrolled ([address]     BOB (dyspnea on exertion)     Kyphosis of thoracic region, unspecified kyphosis type     Compression fracture of fourth lumbar vertebra with delayed healing     Compression fracture of first lumbar vertebra with delayed healing     S

## 2017-06-22 PROCEDURE — 85027 COMPLETE CBC AUTOMATED: CPT | Performed by: INTERNAL MEDICINE

## 2017-06-22 PROCEDURE — 80048 BASIC METABOLIC PNL TOTAL CA: CPT | Performed by: INTERNAL MEDICINE

## 2017-06-22 PROCEDURE — 82962 GLUCOSE BLOOD TEST: CPT

## 2017-06-22 RX ORDER — POTASSIUM CHLORIDE 20 MEQ/1
20 TABLET, EXTENDED RELEASE ORAL DAILY
Status: DISCONTINUED | OUTPATIENT
Start: 2017-06-22 | End: 2017-06-23

## 2017-06-22 NOTE — PROGRESS NOTES
[facility]  Nephrology Progress Note    Louise Forth Attending:  Prateek Noriega, *       SUBJECTIVE:     In better spirits today  No n/v/pain    PHYSICAL EXAM:     Vital Signs: /66 mmHg  Pulse 75  Temp(Src) 98.4 °F (36.9 °C) (Oral) 06/18/2017       Lab Results  Component Value Date   COLORUR Yellow 08/07/2016   CLARITY Hazy* 08/07/2016   SPECGRAVITY 1.021 08/07/2016   GLUUR >=500* 08/07/2016   BILUR Negative 08/07/2016   KETUR 20 * 08/07/2016   BLOODURINE Negative 08/07/2016   PHURIN (NOVOLOG) 100 UNIT/ML flexpen 1-5 Units 1-5 Units Subcutaneous 4 times per day   insulin detemir (LEVEMIR) 100 UNIT/ML flextouch 5 Units 5 Units Subcutaneous Daily   brimonidine Tartrate (ALPHAGAN) 0.2 % ophthalmic solution 1 drop 1 drop Both Eyes BID

## 2017-06-22 NOTE — PROGRESS NOTES
[facility] Hospitalist Progress Note     PCP: Morgan Martinez MD    CC:  Follow up    SUBJECTIVE:  Ms. Dorothea De León she feels well. Eating well today, denies any nausea / vomiting / abdominal pain. Thinks she has had flatus. Last BM 6/20.      OBJECTIVE: Subcutaneous Q8H [address]   • insulin aspart  1-5 Units Subcutaneous 4 times per day   • insulin detemir  5 Units Subcutaneous Daily   • brimonidine Tartrate  1 drop Both Eyes BID        acetaminophen, bisacodyl, dextromethorphan-guaiFENesin, CEPACOL, glucose * decreased in the setting of being NPO / limited po intake--> continue 5 units daily, glc remains controlled  - expected hyperglycemia after IV steroids given, improved off steroids    # DVT Prophylaxis:    - Heparin sq q 8    Dispo: Full code.   Not ready f

## 2017-06-22 NOTE — PLAN OF CARE
Patient/Family Goals    • Patient/Family Long Term Goal Progressing    • Patient/Family Short Term Goal Progressing        RESPIRATORY - ADULT    • Achieves optimal ventilation and oxygenation Progressing          PROBLEM:SBO, HYPONATREMIA    ASSESSMENT:PT

## 2017-06-22 NOTE — PAYOR COMM NOTE
--------------  CONTINUED STAY REVIEW    [employer] MEDICARE ADV PPO  Subscriber #:  [id number]  Authorization Number: [id number]    Admit date: 6/16/2017  1:32 AM       Admitting Physician: Karolina Hudson MD  Attending Physician:  Ross Hassan MD  Prim (Porcine) 5000 UNIT/ML injection 5,000 Units     Date Action Dose Route User    6/22/2017 1218 Given 5000 Units Subcutaneous (Right Lower Abdomen) Dwayne Gallagher RN    6/22/2017 0600 Given 5000 Units Subcutaneous (Left Upper Abdomen) Quynh Torres, RN

## 2017-06-22 NOTE — PLAN OF CARE
Impaired Functional Mobility    • Achieve highest/safest level of mobility/gait Progressing        Impaired Swallowing    • Minimize aspiration risk Progressing        Patient/Family Goals    • Patient/Family Short Term Goal Progressing        RESPIRATORY with delayed healing     Compression fracture of first lumbar vertebra with delayed healing     Small bowel obstruction (HCC)     Nosocomial pneumonia     At risk for falling     SBO (small bowel obstruction) (HCC)     Nausea and vomiting in adult     Hypo

## 2017-06-23 ENCOUNTER — APPOINTMENT (OUTPATIENT)
Dept: GENERAL RADIOLOGY | Facility: HOSPITAL | Age: 82
DRG: 389 | End: 2017-06-23
Attending: HOSPITALIST
Payer: MEDICARE

## 2017-06-23 PROCEDURE — 83935 ASSAY OF URINE OSMOLALITY: CPT | Performed by: INTERNAL MEDICINE

## 2017-06-23 PROCEDURE — 80048 BASIC METABOLIC PNL TOTAL CA: CPT | Performed by: INTERNAL MEDICINE

## 2017-06-23 PROCEDURE — 92611 MOTION FLUOROSCOPY/SWALLOW: CPT

## 2017-06-23 PROCEDURE — 74230 X-RAY XM SWLNG FUNCJ C+: CPT | Performed by: HOSPITALIST

## 2017-06-23 PROCEDURE — 82570 ASSAY OF URINE CREATININE: CPT | Performed by: INTERNAL MEDICINE

## 2017-06-23 PROCEDURE — 85027 COMPLETE CBC AUTOMATED: CPT | Performed by: INTERNAL MEDICINE

## 2017-06-23 PROCEDURE — 84133 ASSAY OF URINE POTASSIUM: CPT | Performed by: INTERNAL MEDICINE

## 2017-06-23 PROCEDURE — 84540 ASSAY OF URINE/UREA-N: CPT | Performed by: INTERNAL MEDICINE

## 2017-06-23 PROCEDURE — 92526 ORAL FUNCTION THERAPY: CPT

## 2017-06-23 PROCEDURE — 84300 ASSAY OF URINE SODIUM: CPT | Performed by: INTERNAL MEDICINE

## 2017-06-23 PROCEDURE — 82962 GLUCOSE BLOOD TEST: CPT

## 2017-06-23 NOTE — PROGRESS NOTES
[facility] Hospitalist Progress Note     PCP: Bernabe Orr MD    CC:  Follow up    SUBJECTIVE:  Ms. Sai Hutchison states she feels well. Bringing up phlegm, denies shortness of breath. States she is hungry and has been eating well.  Understands she is on a chr Subcutaneous 4 times per day   • insulin detemir  5 Units Subcutaneous Daily   • brimonidine Tartrate  1 drop Both Eyes BID        acetaminophen, bisacodyl, dextromethorphan-guaiFENesin, CEPACOL, glucose **OR** Glucose-Vitamin C **OR** dextrose 50% **OR** sq q 8    Dispo: Full code. Discharge planning to [facility] once cleared per renal service. Discussed plan of care with Dr. Elisa Chappell.    Lisa Pierce PA-C  Pager: [phone]    [facility] hospitalist Daily note  Patient was seen/examined on 6/23/17  [facility]

## 2017-06-23 NOTE — DIETARY NOTE
NUTRITION INITIAL ASSESSMENT    Pt is at moderate nutrition risk. Pt does not meet malnutrition criteria.     NUTRITION DIAGNOSIS/PROBLEM:    Inadequate oral intake related to inability to consume sufficient energy as evidenced by NPO/clear liquid diet for Body mass index is 21.66 kg/(m^2).   IBW: 40.9 kg    WEIGHT HISTORY:   Wt Readings from Last 6 Encounters:  06/22/17 : 47 kg (103 lb 9.9 oz)  09/05/16 : 44.453 kg (98 lb)  08/12/16 : 47.3 kg (104 lb 4.4 oz)  11/14/15 : 41.867 kg (92 lb 4.8 oz)  11/13/15 : 4

## 2017-06-23 NOTE — PLAN OF CARE
Impaired Activities of Daily Living    • Achieve highest/safest level of independence in self care Progressing        Impaired Functional Mobility    • Achieve highest/safest level of mobility/gait Progressing        Patient/Family Goals    • Patient/Famil

## 2017-06-23 NOTE — SLP NOTE
ADULT VIDEOFLUOROSCOPIC SWALLOWING STUDY    Admission Date: 6/16/2017  Evaluation Date: 06/23/2017  Radiologist: Dr. Emi Saldivar    Dear Dr. Joceline Brunson,  This letter is to inform you of [facility] Videofluoroscopic Swallowing Study results and/or plan of ana m breathing  Precautions: Aspiration  Imaging results:CXR on 6/18/17:  Pulmonary volumes are low. There appears to be chronic fibrotic scarring in the left lung. There is bilateral juxtadiaphragmatic atelectasis.  The patient is osteoporotic with   multiple h swallow, oral acceptance, retrieval, containment, and transit of the boluses were WFL characterized by timely A-P transit and coordinated bolus transfer, with functional bolus control.  No oral residue noted, patient with functional mastication and clearing

## 2017-06-23 NOTE — PROGRESS NOTES
[facility]  Nephrology Progress Note    Krunal Hendrickson Attending:  Niurka Kaminski, *       SUBJECTIVE:     Notes that she had some worsening back pain overnight, but improved now  No n/v  Na dropped to 127    PHYSICAL EXAM:     Vital Signs: 06/17/2017       Lab Results  Component Value Date   CREUR 28.60 06/18/2017       Lab Results  Component Value Date   COLORUR Yellow 08/07/2016   CLARITY Hazy* 08/07/2016   SPECGRAVITY 1.021 08/07/2016   GLUUR >=500* 08/07/2016   BILUR Negative 08/07/2016 mg 4 mg Intravenous Q6H PRN   ipratropium-albuterol (DUONEB) nebulizer solution 3 mL 3 mL Nebulization Q4H PRN   insulin aspart (NOVOLOG) 100 UNIT/ML flexpen 1-5 Units 1-5 Units Subcutaneous 4 times per day   insulin detemir (LEVEMIR) 100 UNIT/ML flextouch

## 2017-06-23 NOTE — PAYOR COMM NOTE
--------------  CONTINUED STAY REVIEW    [employer] MEDICARE ADV PPO  Subscriber #:  [id number]  Authorization Number: [id number]    Admit date: 6/16/2017  1:32 AM       Admitting Physician: Shanta Silva MD  Attending Physician:  Jesusita Casas MD  Prim Date Action Dose Route User    6/23/2017 1046 Given 5 Units Subcutaneous (Left Lower Abdomen) Dany Gao RN      Losartan Potassium (COZAAR) tab 50 mg     Date Action Dose Route User    6/23/2017 3755 Given 50 mg Oral DEN Reese

## 2017-06-23 NOTE — SLP NOTE
SPEECH DAILY NOTE - INPATIENT    Evaluation Date: 06/23/2017    ASSESSMENT & PLAN   ASSESSMENT  Contacted patient at bedside when she was asleep. Patient was easily aroused. She was placed in upright chair mode position in bed.  Breakfast tray which was ful Comments:    Interdisciplinary Communication: Discussed with RN  Plan posted at bedside  Recommendations posted at bedside    GOALS  GOALS  Goal #1  The patient will tolerate FLD wtih advancement to low residue consistency and thin liquids without overt

## 2017-06-24 VITALS
SYSTOLIC BLOOD PRESSURE: 153 MMHG | OXYGEN SATURATION: 96 % | WEIGHT: 103.63 LBS | RESPIRATION RATE: 18 BRPM | HEART RATE: 92 BPM | DIASTOLIC BLOOD PRESSURE: 71 MMHG | HEIGHT: 58 IN | TEMPERATURE: 98 F | BODY MASS INDEX: 21.75 KG/M2

## 2017-06-24 PROCEDURE — 80048 BASIC METABOLIC PNL TOTAL CA: CPT | Performed by: INTERNAL MEDICINE

## 2017-06-24 PROCEDURE — 82962 GLUCOSE BLOOD TEST: CPT

## 2017-06-24 PROCEDURE — 85027 COMPLETE CBC AUTOMATED: CPT | Performed by: INTERNAL MEDICINE

## 2017-06-24 RX ORDER — LOSARTAN POTASSIUM 100 MG/1
100 TABLET ORAL DAILY
Status: DISCONTINUED | OUTPATIENT
Start: 2017-06-24 | End: 2017-06-24

## 2017-06-24 RX ORDER — ALBUTEROL SULFATE 90 UG/1
2 AEROSOL, METERED RESPIRATORY (INHALATION) EVERY 6 HOURS PRN
Status: DISCONTINUED | OUTPATIENT
Start: 2017-06-24 | End: 2017-06-24

## 2017-06-24 RX ORDER — ESCITALOPRAM OXALATE 10 MG/1
10 TABLET ORAL DAILY
Status: DISCONTINUED | OUTPATIENT
Start: 2017-06-24 | End: 2017-06-24

## 2017-06-24 RX ORDER — GUAIFENESIN 600 MG
600 TABLET, EXTENDED RELEASE 12 HR ORAL 2 TIMES DAILY
Qty: 30 TABLET | Refills: 0 | Status: SHIPPED | OUTPATIENT
Start: 2017-06-24

## 2017-06-24 RX ORDER — POTASSIUM CHLORIDE 20 MEQ/1
20 TABLET, EXTENDED RELEASE ORAL ONCE
Status: DISCONTINUED | OUTPATIENT
Start: 2017-06-24 | End: 2017-06-24

## 2017-06-24 RX ORDER — ASPIRIN 81 MG/1
81 TABLET ORAL DAILY
Status: DISCONTINUED | OUTPATIENT
Start: 2017-06-24 | End: 2017-06-24

## 2017-06-24 RX ORDER — METOPROLOL SUCCINATE 25 MG/1
25 TABLET, EXTENDED RELEASE ORAL
Status: DISCONTINUED | OUTPATIENT
Start: 2017-06-24 | End: 2017-06-24

## 2017-06-24 RX ORDER — SODIUM CHLORIDE 1000 MG
2 TABLET, SOLUBLE MISCELLANEOUS
Qty: 180 TABLET | Refills: 11 | Status: SHIPPED | OUTPATIENT
Start: 2017-06-24

## 2017-06-24 RX ORDER — FUROSEMIDE 20 MG/1
10 TABLET ORAL 2 TIMES DAILY
Qty: 60 TABLET | Refills: 6 | Status: SHIPPED | OUTPATIENT
Start: 2017-06-24

## 2017-06-24 NOTE — CERTIFICATION
**Certification    PHYSICIAN Certification of Need for Inpatient Hospitalization    Based on the her current state of illness, Jairo Bunch requires inpatient hospitalization for her SBO

## 2017-06-24 NOTE — PROGRESS NOTES
[address] [facility]  Nephrology Progress Note    Raghu Akhtar Attending:  Torsten Slaas MD       SUBJECTIVE:     Pt has no nausea,vomiting, pain or urinary complaints. Na improved to 131 mmol/L.     PHYSICAL EXAM:     Vital Signs: /61 (BP Location: 06/17/2017   NE 13.22 (H) 06/17/2017   LYMABS 0.32 (L) 06/17/2017   MOABSO 0.90 (H) 06/17/2017   EOABSO 0.00 06/17/2017   BAABSO 0.01 06/17/2017       Lab Results  Component Value Date   CREUR 13.90 06/23/2017       Lab Results  Component Value Date   COLO sulfate (PF) 2 MG/ML injection 2 mg 2 mg Intravenous Q2H PRN   Or      morphINE sulfate (PF) 4 MG/ML injection 4 mg 4 mg Intravenous Q2H PRN   ondansetron HCl (ZOFRAN) injection 4 mg 4 mg Intravenous Q6H PRN   ipratropium-albuterol (DUONEB) nebulizer solut

## 2017-06-24 NOTE — CM/SW NOTE
06/24/17 1500   Discharge disposition   Discharged to: Skilled Nurs   Name of Facillity/Home Care/Hospice [address]   Discharge transportation [facility]     Pt to return to [facility] [phone] at [address] [facility] via [facility]

## 2017-06-24 NOTE — PROGRESS NOTES
NURSING DISCHARGE NOTE    Discharged Nursing home via Ambulance. Accompanied by Support staff  Belongings Taken by patient/family. Patient discharged home to [facility].   Report given to Jeannette Burdick

## 2017-06-24 NOTE — DISCHARGE SUMMARY
[facility]  Discharge Summary    Jossue Cardona Patient Status:  Inpatient    1934 MRN [id number]   [facility] 5NW-A Attending Sheryle Bjork, MD   [address] Day # 8 PCP Samara Shah MD     Date of Admission: 2017    Date o Denies any fevers or chills. Denies chest pain or pressure. Denies any abdominal pain or cramping. States she feels much better since NG placed and she does not feel hungry or have an appetite.  Patient states she had an SBO in the fall and symptoms were ve admit). For now (due to hyponatremia/SIADH) SSRI is held.  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 ([facility] Insulin long-term use ([facility]  Diabetic polyneuropathy associated with type 2 diabetes mellitus ([facility] DM (diabetes mellitus), type 2, uncontrolled (HCC)    Metoprolol Succinate ER (TOPROL XL) 25 M of liquid  - Offer pills one at a time, crush or deliver with applesauce as needed  - Discontinue feeding and notify MD (or speech pathologist) if coughing or persistent throat clearing or wet/gurgly vocal quality is noted      Please seek medical attentio medications  Do not exceed 4grams acetaminophen within 24 hours            Juana Holt  6/24/2017  5:05 PM

## 2017-09-22 ENCOUNTER — APPOINTMENT (OUTPATIENT)
Dept: CT IMAGING | Facility: HOSPITAL | Age: 82
End: 2017-09-22
Attending: EMERGENCY MEDICINE
Payer: MEDICARE

## 2017-09-22 ENCOUNTER — HOSPITAL ENCOUNTER (EMERGENCY)
Facility: HOSPITAL | Age: 82
Discharge: HOME | End: 2017-09-23
Attending: EMERGENCY MEDICINE
Payer: MEDICARE

## 2017-09-22 VITALS
WEIGHT: 96 LBS | DIASTOLIC BLOOD PRESSURE: 56 MMHG | BODY MASS INDEX: 20 KG/M2 | RESPIRATION RATE: 16 BRPM | HEART RATE: 90 BPM | OXYGEN SATURATION: 99 % | SYSTOLIC BLOOD PRESSURE: 100 MMHG

## 2017-09-22 DIAGNOSIS — M54.50 BACK PAIN OF THORACOLUMBAR REGION: ICD-10-CM

## 2017-09-22 DIAGNOSIS — K59.00 CONSTIPATION, UNSPECIFIED CONSTIPATION TYPE: Primary | ICD-10-CM

## 2017-09-22 DIAGNOSIS — K86.2 PANCREATIC CYST: ICD-10-CM

## 2017-09-22 DIAGNOSIS — R91.1 PULMONARY NODULE: ICD-10-CM

## 2017-09-22 DIAGNOSIS — M54.6 BACK PAIN OF THORACOLUMBAR REGION: ICD-10-CM

## 2017-09-22 LAB
ALBUMIN SERPL-MCNC: 3.1 G/DL (ref 3.5–4.8)
ALP LIVER SERPL-CCNC: 91 U/L (ref 55–142)
ALT SERPL-CCNC: 26 U/L (ref 14–54)
AST SERPL-CCNC: 26 U/L (ref 15–41)
BASOPHILS # BLD AUTO: 0.03 X10(3) UL (ref 0–0.1)
BASOPHILS NFR BLD AUTO: 0.2 %
BILIRUB SERPL-MCNC: 0.3 MG/DL (ref 0.1–2)
BILIRUB UR QL STRIP.AUTO: NEGATIVE
BUN BLD-MCNC: 25 MG/DL (ref 8–20)
CALCIUM BLD-MCNC: 8 MG/DL (ref 8.3–10.3)
CHLORIDE: 105 MMOL/L (ref 101–111)
CO2: 28 MMOL/L (ref 22–32)
COLOR UR AUTO: YELLOW
CREAT BLD-MCNC: 1.35 MG/DL (ref 0.55–1.02)
EOSINOPHIL # BLD AUTO: 0.03 X10(3) UL (ref 0–0.3)
EOSINOPHIL NFR BLD AUTO: 0.2 %
ERYTHROCYTE [DISTWIDTH] IN BLOOD BY AUTOMATED COUNT: 14.3 % (ref 11.5–16)
GLUCOSE BLD-MCNC: 178 MG/DL (ref 70–99)
GLUCOSE UR STRIP.AUTO-MCNC: NEGATIVE MG/DL
HCT VFR BLD AUTO: 39.9 % (ref 34–50)
HGB BLD-MCNC: 12.7 G/DL (ref 12–16)
IMMATURE GRANULOCYTE COUNT: 0.05 X10(3) UL (ref 0–1)
IMMATURE GRANULOCYTE RATIO %: 0.4 %
KETONES UR STRIP.AUTO-MCNC: NEGATIVE MG/DL
LYMPHOCYTES # BLD AUTO: 0.66 X10(3) UL (ref 0.9–4)
LYMPHOCYTES NFR BLD AUTO: 5.2 %
M PROTEIN MFR SERPL ELPH: 6.7 G/DL (ref 6.1–8.3)
MCH RBC QN AUTO: 29.6 PG (ref 27–33.2)
MCHC RBC AUTO-ENTMCNC: 31.8 G/DL (ref 31–37)
MCV RBC AUTO: 93 FL (ref 81–100)
MONOCYTES # BLD AUTO: 0.42 X10(3) UL (ref 0.1–0.6)
MONOCYTES NFR BLD AUTO: 3.3 %
NEUTROPHIL ABS PRELIM: 11.53 X10 (3) UL (ref 1.3–6.7)
NEUTROPHILS # BLD AUTO: 11.53 X10(3) UL (ref 1.3–6.7)
NEUTROPHILS NFR BLD AUTO: 90.7 %
NITRITE UR QL STRIP.AUTO: POSITIVE
PH UR STRIP.AUTO: 5.5 [PH] (ref 4.5–8)
PLATELET # BLD AUTO: 305 10(3)UL (ref 150–450)
POTASSIUM SERPL-SCNC: 4.7 MMOL/L (ref 3.6–5.1)
PROT UR STRIP.AUTO-MCNC: NEGATIVE MG/DL
RBC # BLD AUTO: 4.29 X10(6)UL (ref 3.8–5.1)
RED CELL DISTRIBUTION WIDTH-SD: 48.1 FL (ref 35.1–46.3)
SODIUM SERPL-SCNC: 140 MMOL/L (ref 136–144)
SP GR UR STRIP.AUTO: 1.01 (ref 1–1.03)
UROBILINOGEN UR STRIP.AUTO-MCNC: 0.2 MG/DL
WBC # BLD AUTO: 12.7 X10(3) UL (ref 4–13)

## 2017-09-22 PROCEDURE — 87077 CULTURE AEROBIC IDENTIFY: CPT | Performed by: EMERGENCY MEDICINE

## 2017-09-22 PROCEDURE — 82272 OCCULT BLD FECES 1-3 TESTS: CPT

## 2017-09-22 PROCEDURE — 87186 SC STD MICRODIL/AGAR DIL: CPT | Performed by: EMERGENCY MEDICINE

## 2017-09-22 PROCEDURE — 99285 EMERGENCY DEPT VISIT HI MDM: CPT

## 2017-09-22 PROCEDURE — 87086 URINE CULTURE/COLONY COUNT: CPT | Performed by: EMERGENCY MEDICINE

## 2017-09-22 PROCEDURE — 74177 CT ABD & PELVIS W/CONTRAST: CPT | Performed by: EMERGENCY MEDICINE

## 2017-09-22 PROCEDURE — 80053 COMPREHEN METABOLIC PANEL: CPT | Performed by: EMERGENCY MEDICINE

## 2017-09-22 PROCEDURE — 87147 CULTURE TYPE IMMUNOLOGIC: CPT | Performed by: EMERGENCY MEDICINE

## 2017-09-22 PROCEDURE — 85025 COMPLETE CBC W/AUTO DIFF WBC: CPT | Performed by: EMERGENCY MEDICINE

## 2017-09-22 PROCEDURE — 96361 HYDRATE IV INFUSION ADD-ON: CPT

## 2017-09-22 PROCEDURE — 96360 HYDRATION IV INFUSION INIT: CPT

## 2017-09-22 PROCEDURE — 81001 URINALYSIS AUTO W/SCOPE: CPT | Performed by: EMERGENCY MEDICINE

## 2017-09-22 RX ORDER — ACETAMINOPHEN 500 MG
1000 TABLET ORAL ONCE
Status: COMPLETED | OUTPATIENT
Start: 2017-09-22 | End: 2017-09-22

## 2017-09-22 NOTE — ED PROVIDER NOTES
Patient Seen in: [facility] Emergency Department    History   Patient presents with:  Abdomen/Flank Pain (GI/)    Stated Complaint:     HPI    Patient is a pleasant 80-year-old female that was sent in with concerns of a bowel obstruction.   Patient TONSILLECTOMY      Comment: as child    Family History   Problem Relation Age of Onset   • Heart Attack Mother        Smoking status: Current Every Day Smoker                                                   Packs/day: 0.50      Years: 61.00     Smokeless All other components within normal limits   CBC WITH DIFFERENTIAL WITH PLATELET    Narrative: The following orders were created for panel order CBC WITH DIFFERENTIAL WITH PLATELET.   Procedure                               Abnormality         Status care doctor to discuss      Disposition and Plan     Clinical Impression:  Constipation, unspecified constipation type  (primary encounter diagnosis)  Back pain of thoracolumbar region  Pulmonary nodule  Pancreatic cyst    Disposition:  Discharge    Follow

## 2017-09-23 LAB — RBC #/AREA URNS AUTO: >10 /HPF

## 2017-09-23 RX ORDER — TRAMADOL HYDROCHLORIDE 50 MG/1
TABLET ORAL
Status: COMPLETED
Start: 2017-09-23 | End: 2017-09-23

## 2017-09-23 RX ORDER — TRAMADOL HYDROCHLORIDE 50 MG/1
TABLET ORAL EVERY 4 HOURS PRN
Qty: 10 TABLET | Refills: 0 | Status: SHIPPED | OUTPATIENT
Start: 2017-09-23

## 2017-09-23 RX ORDER — TRAMADOL HYDROCHLORIDE 50 MG/1
50 TABLET ORAL ONCE
Status: COMPLETED | OUTPATIENT
Start: 2017-09-23 | End: 2017-09-23

## 2017-09-23 NOTE — ED NOTES
Plan of care discussed, patient denies new c/o and appears comfortable, respirations easy and regular, skin p/w/d

## 2017-09-23 NOTE — ED NOTES
[address] contacted for a BLS transport to [facility].  ETA 15 minutes @ 11:28

## 2017-09-23 NOTE — ED NOTES
Patient had small results from enema but is now c/o has c/o low back pain. MD notified, patient repositioned for comfort.

## 2017-09-23 NOTE — ED NOTES
Plan of care discussed, patient c/o low back pain, MD aware.  Denies nausea or abdominal pain, respirations normal skin p/w/d

## 2017-09-25 RX ORDER — NITROFURANTOIN 25; 75 MG/1; MG/1
100 CAPSULE ORAL 2 TIMES DAILY
Qty: 20 CAPSULE | Refills: 0 | Status: SHIPPED | OUTPATIENT
Start: 2017-09-25 | End: 2017-10-05

## 2017-09-25 NOTE — CM/SW NOTE
Called in Rx to [facility]. [phone]. Macrobid 100 mg, BID, 10 days. Quant 20, no refills. Pablito Welch RN at [employer] [facility] made aware.

## 2017-10-19 ENCOUNTER — HOSPITAL ENCOUNTER (EMERGENCY)
Facility: HOSPITAL | Age: 82
Discharge: HOME | End: 2017-10-19
Attending: EMERGENCY MEDICINE
Payer: MEDICARE

## 2017-10-19 ENCOUNTER — APPOINTMENT (OUTPATIENT)
Dept: GENERAL RADIOLOGY | Facility: HOSPITAL | Age: 82
End: 2017-10-19
Attending: EMERGENCY MEDICINE
Payer: MEDICARE

## 2017-10-19 VITALS
RESPIRATION RATE: 16 BRPM | BODY MASS INDEX: 20.13 KG/M2 | WEIGHT: 95.88 LBS | SYSTOLIC BLOOD PRESSURE: 127 MMHG | HEIGHT: 57.99 IN | TEMPERATURE: 98 F | OXYGEN SATURATION: 98 % | DIASTOLIC BLOOD PRESSURE: 66 MMHG | HEART RATE: 76 BPM

## 2017-10-19 DIAGNOSIS — S80.12XA CONTUSION OF LEFT LOWER EXTREMITY, INITIAL ENCOUNTER: Primary | ICD-10-CM

## 2017-10-19 PROCEDURE — 85610 PROTHROMBIN TIME: CPT | Performed by: EMERGENCY MEDICINE

## 2017-10-19 PROCEDURE — 99284 EMERGENCY DEPT VISIT MOD MDM: CPT

## 2017-10-19 PROCEDURE — 85025 COMPLETE CBC W/AUTO DIFF WBC: CPT | Performed by: EMERGENCY MEDICINE

## 2017-10-19 PROCEDURE — 99283 EMERGENCY DEPT VISIT LOW MDM: CPT

## 2017-10-19 PROCEDURE — 36415 COLL VENOUS BLD VENIPUNCTURE: CPT

## 2017-10-19 NOTE — ED PROVIDER NOTES
Patient Seen in: [facility] Emergency Department    History   Patient presents with:  Lower Extremity Injury (musculoskeletal)    Stated Complaint:     HPI    This is an 12-year-old female who is bedbound.   The patient states that she normally uses Comment: cardiac stents  2003: OTHER SURGICAL HISTORY      Comment: right breast lumpectomy  1992: RADIATION RIGHT  No date: TONSILLECTOMY      Comment: as child    Family History   Problem Relation Age of Onset   • Heart Attack Mother        Smoking statu MCHC 30.9 (*)     RDW-SD 54.6 (*)     Lymphocyte Absolute 0.60 (*)     All other components within normal limits   PROTHROMBIN TIME (PT) - Normal   CBC WITH DIFFERENTIAL WITH PLATELET    Narrative:      The following orders were created for panel order C nursing home it was still not available the family stated that they had been told that the x-rays were negative they were told to come in here just for possible drainage I do not feel the patient needs to be drained at this present time is just a hematoma

## 2017-10-31 ENCOUNTER — OFFICE VISIT (OUTPATIENT)
Dept: SURGERY | Facility: CLINIC | Age: 82
End: 2017-10-31

## 2017-10-31 ENCOUNTER — HOSPITAL ENCOUNTER (OUTPATIENT)
Dept: ULTRASOUND IMAGING | Facility: HOSPITAL | Age: 82
Discharge: HOME | End: 2017-10-31
Attending: SURGERY
Payer: MEDICARE

## 2017-10-31 VITALS
DIASTOLIC BLOOD PRESSURE: 74 MMHG | BODY MASS INDEX: 20.71 KG/M2 | WEIGHT: 96 LBS | HEART RATE: 83 BPM | HEIGHT: 57 IN | SYSTOLIC BLOOD PRESSURE: 119 MMHG

## 2017-10-31 DIAGNOSIS — R60.0 BILATERAL EDEMA OF LOWER EXTREMITY: ICD-10-CM

## 2017-10-31 DIAGNOSIS — I82.492 ACUTE DEEP VEIN THROMBOSIS (DVT) OF OTHER SPECIFIED VEIN OF LEFT LOWER EXTREMITY (HCC): ICD-10-CM

## 2017-10-31 DIAGNOSIS — M79.89 LOCALIZED SWELLING OF LOWER EXTREMITY: ICD-10-CM

## 2017-10-31 DIAGNOSIS — I82.492 ACUTE DEEP VEIN THROMBOSIS (DVT) OF OTHER SPECIFIED VEIN OF LEFT LOWER EXTREMITY (HCC): Primary | ICD-10-CM

## 2017-10-31 DIAGNOSIS — I89.0 LYMPH EDEMA: ICD-10-CM

## 2017-10-31 DIAGNOSIS — I87.2 STASIS DERMATITIS OF BOTH LEGS: ICD-10-CM

## 2017-10-31 DIAGNOSIS — S80.12XA TRAUMATIC HEMATOMA OF LEFT LOWER LEG, INITIAL ENCOUNTER: ICD-10-CM

## 2017-10-31 PROCEDURE — 99203 OFFICE O/P NEW LOW 30 MIN: CPT | Performed by: SURGERY

## 2017-10-31 PROCEDURE — 93971 EXTREMITY STUDY: CPT | Performed by: SURGERY

## 2017-10-31 NOTE — H&P
New Patient  Jah Reese  2/13/1934    10/31/2017    This patient was referred by Gonsalo Marcano MD for evaluation and consultation for left leg hematoma. Chief Complaint: Traumatic left leg hematoma    History of Present Illness:  The patient i breast cancer 4/28/2015   • Hyperlipidemia    • Lymphedema    • MI, old     1991 s/p stent,    • Osteoporosis    • Peripheral autonomic neuropathy due to diabetes mellitus ([facility]    • Spinal stenosis    • Stasis dermatitis of both legs 6/4/2015   • Type II Pain., Disp: , Rfl:   •  Losartan Potassium 25 MG Oral Tab, Take 2 tablets (50 mg total) by mouth daily. , Disp: 30 tablet, Rfl: 0  •  ipratropium-albuterol 0.5-2.5 (3) MG/3ML Inhalation Solution, Take 3 mL by nebulization. , Disp: , Rfl:   •  Saline Nasal S Objective/Physical Exam:  General: Alert, orientated x3. Cooperative. No apparent distress. Vital Signs:  Blood pressure 119/74, pulse 83, height 57\", weight 96 lb. HEENT: Exam is unremarkable. Without scleral icterus.   Mucous membranes are fidencio October 17, 2017. Bautista Costa is a resident at [facility] and he has been nonambulatory for 2 years. She does bear weight and transfers from her wheelchair with assistance. The patient was seen in the emergency department on October 19, 2017.   At that ti the left lower extremity to rule out DVT-further recommendations pending ultrasound results  No indication for surgical intervention of the hematoma. This is expected to resolve spontaneously with time.     Thank you for allowing me to participate in your

## 2018-01-01 ENCOUNTER — APPOINTMENT (OUTPATIENT)
Dept: GENERAL RADIOLOGY | Facility: HOSPITAL | Age: 83
DRG: 329 | End: 2018-01-01
Attending: NURSE PRACTITIONER
Payer: MEDICARE

## 2018-01-01 ENCOUNTER — APPOINTMENT (OUTPATIENT)
Dept: CT IMAGING | Facility: HOSPITAL | Age: 83
DRG: 329 | End: 2018-01-01
Attending: EMERGENCY MEDICINE
Payer: MEDICARE

## 2018-01-01 ENCOUNTER — APPOINTMENT (OUTPATIENT)
Dept: CV DIAGNOSTICS | Facility: HOSPITAL | Age: 83
DRG: 329 | End: 2018-01-01
Attending: INTERNAL MEDICINE
Payer: MEDICARE

## 2018-01-01 ENCOUNTER — APPOINTMENT (OUTPATIENT)
Dept: GENERAL RADIOLOGY | Facility: HOSPITAL | Age: 83
DRG: 329 | End: 2018-01-01
Attending: INTERNAL MEDICINE
Payer: MEDICARE

## 2018-01-01 ENCOUNTER — APPOINTMENT (OUTPATIENT)
Dept: GENERAL RADIOLOGY | Facility: HOSPITAL | Age: 83
DRG: 329 | End: 2018-01-01
Attending: SURGERY
Payer: MEDICARE

## 2018-01-01 ENCOUNTER — ANESTHESIA EVENT (OUTPATIENT)
Dept: SURGERY | Facility: HOSPITAL | Age: 83
DRG: 329 | End: 2018-01-01
Payer: MEDICARE

## 2018-01-01 ENCOUNTER — HOSPITAL ENCOUNTER (INPATIENT)
Facility: HOSPITAL | Age: 83
LOS: 11 days | DRG: 329 | End: 2018-01-01
Attending: EMERGENCY MEDICINE | Admitting: INTERNAL MEDICINE
Payer: MEDICARE

## 2018-01-01 ENCOUNTER — ANESTHESIA (OUTPATIENT)
Dept: SURGERY | Facility: HOSPITAL | Age: 83
DRG: 329 | End: 2018-01-01
Payer: MEDICARE

## 2018-01-01 VITALS
SYSTOLIC BLOOD PRESSURE: 112 MMHG | HEIGHT: 57 IN | DIASTOLIC BLOOD PRESSURE: 76 MMHG | TEMPERATURE: 97 F | WEIGHT: 106.94 LBS | HEART RATE: 25 BPM | BODY MASS INDEX: 23.07 KG/M2 | OXYGEN SATURATION: 88 %

## 2018-01-01 DIAGNOSIS — K55.9 MESENTERIC ISCHEMIA (HCC): ICD-10-CM

## 2018-01-01 DIAGNOSIS — K56.609 SMALL BOWEL OBSTRUCTION (HCC): Primary | ICD-10-CM

## 2018-01-01 DIAGNOSIS — K56.609 BOWEL OBSTRUCTION (HCC): ICD-10-CM

## 2018-01-01 DIAGNOSIS — K83.8 PNEUMOBILIA: ICD-10-CM

## 2018-01-01 PROCEDURE — 71045 X-RAY EXAM CHEST 1 VIEW: CPT | Performed by: NURSE PRACTITIONER

## 2018-01-01 PROCEDURE — 0WJG0ZZ INSPECTION OF PERITONEAL CAVITY, OPEN APPROACH: ICD-10-PCS | Performed by: SURGERY

## 2018-01-01 PROCEDURE — 74230 X-RAY XM SWLNG FUNCJ C+: CPT | Performed by: SURGERY

## 2018-01-01 PROCEDURE — 71045 X-RAY EXAM CHEST 1 VIEW: CPT | Performed by: INTERNAL MEDICINE

## 2018-01-01 PROCEDURE — 3E043XZ INTRODUCTION OF VASOPRESSOR INTO CENTRAL VEIN, PERCUTANEOUS APPROACH: ICD-10-PCS | Performed by: SURGERY

## 2018-01-01 PROCEDURE — 74018 RADEX ABDOMEN 1 VIEW: CPT | Performed by: INTERNAL MEDICINE

## 2018-01-01 PROCEDURE — 93306 TTE W/DOPPLER COMPLETE: CPT | Performed by: INTERNAL MEDICINE

## 2018-01-01 PROCEDURE — 5A1945Z RESPIRATORY VENTILATION, 24-96 CONSECUTIVE HOURS: ICD-10-PCS | Performed by: ANESTHESIOLOGY

## 2018-01-01 PROCEDURE — 5A09457 ASSISTANCE WITH RESPIRATORY VENTILATION, 24-96 CONSECUTIVE HOURS, CONTINUOUS POSITIVE AIRWAY PRESSURE: ICD-10-PCS | Performed by: SURGERY

## 2018-01-01 PROCEDURE — 30233N1 TRANSFUSION OF NONAUTOLOGOUS RED BLOOD CELLS INTO PERIPHERAL VEIN, PERCUTANEOUS APPROACH: ICD-10-PCS | Performed by: SURGERY

## 2018-01-01 PROCEDURE — B548ZZA ULTRASONOGRAPHY OF SUPERIOR VENA CAVA, GUIDANCE: ICD-10-PCS | Performed by: SURGERY

## 2018-01-01 PROCEDURE — 0WJP0ZZ INSPECTION OF GASTROINTESTINAL TRACT, OPEN APPROACH: ICD-10-PCS | Performed by: SURGERY

## 2018-01-01 PROCEDURE — 99223 1ST HOSP IP/OBS HIGH 75: CPT | Performed by: SURGERY

## 2018-01-01 PROCEDURE — 0BH18EZ INSERTION OF ENDOTRACHEAL AIRWAY INTO TRACHEA, VIA NATURAL OR ARTIFICIAL OPENING ENDOSCOPIC: ICD-10-PCS | Performed by: ANESTHESIOLOGY

## 2018-01-01 PROCEDURE — 0DNB0ZZ RELEASE ILEUM, OPEN APPROACH: ICD-10-PCS | Performed by: SURGERY

## 2018-01-01 PROCEDURE — 02HV33Z INSERTION OF INFUSION DEVICE INTO SUPERIOR VENA CAVA, PERCUTANEOUS APPROACH: ICD-10-PCS | Performed by: SURGERY

## 2018-01-01 PROCEDURE — 0DBB0ZZ EXCISION OF ILEUM, OPEN APPROACH: ICD-10-PCS | Performed by: SURGERY

## 2018-01-01 PROCEDURE — 74176 CT ABD & PELVIS W/O CONTRAST: CPT | Performed by: EMERGENCY MEDICINE

## 2018-01-01 PROCEDURE — 44120 REMOVAL OF SMALL INTESTINE: CPT | Performed by: SURGERY

## 2018-01-01 RX ORDER — METRONIDAZOLE 500 MG/100ML
500 INJECTION, SOLUTION INTRAVENOUS EVERY 8 HOURS
Status: DISCONTINUED | OUTPATIENT
Start: 2018-01-01 | End: 2018-01-01

## 2018-01-01 RX ORDER — LEVOFLOXACIN 5 MG/ML
500 INJECTION, SOLUTION INTRAVENOUS
Status: DISCONTINUED | OUTPATIENT
Start: 2018-01-01 | End: 2018-01-01

## 2018-01-01 RX ORDER — ACETAMINOPHEN 650 MG/1
650 SUPPOSITORY RECTAL EVERY 6 HOURS PRN
Status: DISCONTINUED | OUTPATIENT
Start: 2018-01-01 | End: 2018-01-01

## 2018-01-01 RX ORDER — ALBUMIN, HUMAN INJ 5% 5 %
250 SOLUTION INTRAVENOUS ONCE
Status: COMPLETED | OUTPATIENT
Start: 2018-01-01 | End: 2018-01-01

## 2018-01-01 RX ORDER — LORAZEPAM 2 MG/ML
0.5 INJECTION INTRAMUSCULAR EVERY 4 HOURS PRN
Status: DISCONTINUED | OUTPATIENT
Start: 2018-01-01 | End: 2018-01-01

## 2018-01-01 RX ORDER — SODIUM CHLORIDE 9 MG/ML
INJECTION, SOLUTION INTRAVENOUS ONCE
Status: COMPLETED | OUTPATIENT
Start: 2018-01-01 | End: 2018-01-01

## 2018-01-01 RX ORDER — FUROSEMIDE 10 MG/ML
20 INJECTION INTRAMUSCULAR; INTRAVENOUS ONCE
Status: COMPLETED | OUTPATIENT
Start: 2018-01-01 | End: 2018-01-01

## 2018-01-01 RX ORDER — MAGNESIUM SULFATE HEPTAHYDRATE 40 MG/ML
2 INJECTION, SOLUTION INTRAVENOUS ONCE
Status: COMPLETED | OUTPATIENT
Start: 2018-01-01 | End: 2018-01-01

## 2018-01-01 RX ORDER — LEVOFLOXACIN 5 MG/ML
500 INJECTION, SOLUTION INTRAVENOUS ONCE
Status: DISCONTINUED | OUTPATIENT
Start: 2018-01-01 | End: 2018-01-01

## 2018-01-01 RX ORDER — SODIUM CHLORIDE, SODIUM LACTATE, POTASSIUM CHLORIDE, CALCIUM CHLORIDE 600; 310; 30; 20 MG/100ML; MG/100ML; MG/100ML; MG/100ML
INJECTION, SOLUTION INTRAVENOUS CONTINUOUS
Status: DISCONTINUED | OUTPATIENT
Start: 2018-01-01 | End: 2018-01-01

## 2018-01-01 RX ORDER — MORPHINE SULFATE 4 MG/ML
2 INJECTION, SOLUTION INTRAMUSCULAR; INTRAVENOUS ONCE
Status: COMPLETED | OUTPATIENT
Start: 2018-01-01 | End: 2018-01-01

## 2018-01-01 RX ORDER — DEXTROSE MONOHYDRATE 50 MG/ML
INJECTION, SOLUTION INTRAVENOUS CONTINUOUS
Status: DISCONTINUED | OUTPATIENT
Start: 2018-01-01 | End: 2018-01-01

## 2018-01-01 RX ORDER — FUROSEMIDE 10 MG/ML
60 INJECTION INTRAMUSCULAR; INTRAVENOUS ONCE
Status: COMPLETED | OUTPATIENT
Start: 2018-01-01 | End: 2018-01-01

## 2018-01-01 RX ORDER — NALOXONE HYDROCHLORIDE 0.4 MG/ML
80 INJECTION, SOLUTION INTRAMUSCULAR; INTRAVENOUS; SUBCUTANEOUS AS NEEDED
Status: ACTIVE | OUTPATIENT
Start: 2018-01-01 | End: 2018-01-01

## 2018-01-01 RX ORDER — DEXMEDETOMIDINE HYDROCHLORIDE 4 UG/ML
INJECTION, SOLUTION INTRAVENOUS CONTINUOUS
Status: DISCONTINUED | OUTPATIENT
Start: 2018-01-01 | End: 2018-01-01

## 2018-01-01 RX ORDER — LIDOCAINE 50 MG/G
1 PATCH TOPICAL EVERY 24 HOURS
Status: DISCONTINUED | OUTPATIENT
Start: 2018-01-01 | End: 2018-01-01

## 2018-01-01 RX ORDER — MELATONIN
325
COMMUNITY

## 2018-01-01 RX ORDER — METRONIDAZOLE 500 MG/100ML
500 INJECTION, SOLUTION INTRAVENOUS ONCE
Status: COMPLETED | OUTPATIENT
Start: 2018-01-01 | End: 2018-01-01

## 2018-01-01 RX ORDER — LEVOFLOXACIN 5 MG/ML
750 INJECTION, SOLUTION INTRAVENOUS EVERY 24 HOURS
Status: DISCONTINUED | OUTPATIENT
Start: 2018-01-01 | End: 2018-01-01

## 2018-01-01 RX ORDER — METOPROLOL TARTRATE 5 MG/5ML
5 INJECTION INTRAVENOUS EVERY 6 HOURS
Status: DISCONTINUED | OUTPATIENT
Start: 2018-01-01 | End: 2018-01-01

## 2018-01-01 RX ORDER — MORPHINE SULFATE 4 MG/ML
2 INJECTION, SOLUTION INTRAMUSCULAR; INTRAVENOUS EVERY 5 MIN PRN
Status: ACTIVE | OUTPATIENT
Start: 2018-01-01 | End: 2018-01-01

## 2018-01-01 RX ORDER — POTASSIUM CHLORIDE 750 MG/1
10 TABLET, EXTENDED RELEASE ORAL DAILY
COMMUNITY

## 2018-01-01 RX ORDER — SODIUM CHLORIDE 9 MG/ML
INJECTION, SOLUTION INTRAVENOUS ONCE
Status: DISCONTINUED | OUTPATIENT
Start: 2018-01-01 | End: 2018-01-01

## 2018-01-01 RX ORDER — SODIUM CHLORIDE 30 MG/ML INHALATION SOLUTION 30 MG/ML
3 SOLUTION INHALANT
Status: DISCONTINUED | OUTPATIENT
Start: 2018-01-01 | End: 2018-01-01

## 2018-01-01 RX ORDER — LORAZEPAM 2 MG/ML
2 INJECTION INTRAMUSCULAR EVERY 4 HOURS PRN
Status: DISCONTINUED | OUTPATIENT
Start: 2018-01-01 | End: 2018-01-01

## 2018-01-01 RX ORDER — BISACODYL 10 MG
10 SUPPOSITORY, RECTAL RECTAL
Status: DISCONTINUED | OUTPATIENT
Start: 2018-01-01 | End: 2018-01-01

## 2018-01-01 RX ORDER — POLYETHYLENE GLYCOL 3350 17 G/17G
17 POWDER, FOR SOLUTION ORAL DAILY PRN
Status: DISCONTINUED | OUTPATIENT
Start: 2018-01-01 | End: 2018-01-01

## 2018-01-01 RX ORDER — ACETAMINOPHEN 160 MG/5ML
650 SOLUTION ORAL EVERY 6 HOURS PRN
Status: DISCONTINUED | OUTPATIENT
Start: 2018-01-01 | End: 2018-01-01

## 2018-01-01 RX ORDER — LORAZEPAM 2 MG/ML
1 INJECTION INTRAMUSCULAR EVERY 4 HOURS PRN
Status: DISCONTINUED | OUTPATIENT
Start: 2018-01-01 | End: 2018-01-01

## 2018-01-01 RX ORDER — POTASSIUM CHLORIDE 29.8 MG/ML
40 INJECTION INTRAVENOUS ONCE
Status: COMPLETED | OUTPATIENT
Start: 2018-01-01 | End: 2018-01-01

## 2018-01-01 RX ORDER — IPRATROPIUM BROMIDE AND ALBUTEROL SULFATE 2.5; .5 MG/3ML; MG/3ML
3 SOLUTION RESPIRATORY (INHALATION)
Status: DISCONTINUED | OUTPATIENT
Start: 2018-01-01 | End: 2018-01-01

## 2018-01-01 RX ORDER — ONDANSETRON 4 MG/1
4 TABLET, ORALLY DISINTEGRATING ORAL EVERY 6 HOURS PRN
COMMUNITY

## 2018-01-01 RX ORDER — SODIUM CHLORIDE, SODIUM LACTATE, POTASSIUM CHLORIDE, CALCIUM CHLORIDE 600; 310; 30; 20 MG/100ML; MG/100ML; MG/100ML; MG/100ML
INJECTION, SOLUTION INTRAVENOUS ONCE
Status: COMPLETED | OUTPATIENT
Start: 2018-01-01 | End: 2018-01-01

## 2018-01-01 RX ORDER — ACETAMINOPHEN 325 MG/1
650 TABLET ORAL EVERY 6 HOURS PRN
Status: DISCONTINUED | OUTPATIENT
Start: 2018-01-01 | End: 2018-01-01

## 2018-01-01 RX ORDER — ONDANSETRON 2 MG/ML
4 INJECTION INTRAMUSCULAR; INTRAVENOUS ONCE
Status: COMPLETED | OUTPATIENT
Start: 2018-01-01 | End: 2018-01-01

## 2018-01-01 RX ORDER — IPRATROPIUM BROMIDE AND ALBUTEROL SULFATE 2.5; .5 MG/3ML; MG/3ML
3 SOLUTION RESPIRATORY (INHALATION) EVERY 4 HOURS PRN
Status: DISCONTINUED | OUTPATIENT
Start: 2018-01-01 | End: 2018-01-01

## 2018-01-01 RX ORDER — POTASSIUM CHLORIDE 14.9 MG/ML
20 INJECTION INTRAVENOUS ONCE
Status: COMPLETED | OUTPATIENT
Start: 2018-01-01 | End: 2018-01-01

## 2018-01-01 RX ORDER — ONDANSETRON 2 MG/ML
INJECTION INTRAMUSCULAR; INTRAVENOUS
Status: DISPENSED
Start: 2018-01-01 | End: 2018-01-01

## 2018-01-01 RX ORDER — HEPARIN SODIUM 5000 [USP'U]/ML
5000 INJECTION, SOLUTION INTRAVENOUS; SUBCUTANEOUS EVERY 12 HOURS
Status: DISCONTINUED | OUTPATIENT
Start: 2018-01-01 | End: 2018-01-01

## 2018-01-01 RX ORDER — DEXMEDETOMIDINE HYDROCHLORIDE 4 UG/ML
INJECTION, SOLUTION INTRAVENOUS CONTINUOUS
Status: DISCONTINUED | OUTPATIENT
Start: 2018-01-01 | End: 2018-01-01 | Stop reason: ALTCHOICE

## 2018-01-01 RX ORDER — LEVOFLOXACIN 5 MG/ML
750 INJECTION, SOLUTION INTRAVENOUS ONCE
Status: DISCONTINUED | OUTPATIENT
Start: 2018-01-01 | End: 2018-01-01

## 2018-01-01 RX ORDER — LEVOFLOXACIN 5 MG/ML
750 INJECTION, SOLUTION INTRAVENOUS
Status: DISCONTINUED | OUTPATIENT
Start: 2018-01-01 | End: 2018-01-01

## 2018-01-01 RX ORDER — HYDROMORPHONE HYDROCHLORIDE 1 MG/ML
0.4 INJECTION, SOLUTION INTRAMUSCULAR; INTRAVENOUS; SUBCUTANEOUS EVERY 5 MIN PRN
Status: ACTIVE | OUTPATIENT
Start: 2018-01-01 | End: 2018-01-01

## 2018-01-01 RX ORDER — ONDANSETRON 2 MG/ML
4 INJECTION INTRAMUSCULAR; INTRAVENOUS EVERY 6 HOURS PRN
Status: DISCONTINUED | OUTPATIENT
Start: 2018-01-01 | End: 2018-01-01

## 2018-01-01 RX ORDER — POLYETHYLENE GLYCOL 3350 17 G/17G
17 POWDER, FOR SOLUTION ORAL DAILY
COMMUNITY

## 2018-01-01 RX ORDER — CHLORHEXIDINE GLUCONATE 0.12 MG/ML
15 RINSE ORAL
Status: DISCONTINUED | OUTPATIENT
Start: 2018-01-01 | End: 2018-01-01

## 2018-01-01 RX ORDER — ALBUTEROL SULFATE 2.5 MG/3ML
2.5 SOLUTION RESPIRATORY (INHALATION) EVERY 4 HOURS PRN
Status: DISCONTINUED | OUTPATIENT
Start: 2018-01-01 | End: 2018-01-01

## 2018-01-01 RX ORDER — FUROSEMIDE 10 MG/ML
40 INJECTION INTRAMUSCULAR; INTRAVENOUS ONCE
Status: COMPLETED | OUTPATIENT
Start: 2018-01-01 | End: 2018-01-01

## 2018-01-01 RX ORDER — BISACODYL 10 MG
10 SUPPOSITORY, RECTAL RECTAL DAILY PRN
COMMUNITY

## 2018-12-10 PROBLEM — K83.8 PNEUMOBILIA: Status: ACTIVE | Noted: 2018-01-01

## 2018-12-10 PROBLEM — K55.9 MESENTERIC ISCHEMIA (HCC): Status: ACTIVE | Noted: 2018-01-01

## 2018-12-10 NOTE — OPERATIVE REPORT
[facility]  Op Note    Waqas Paige Location: OR   CSN [id number] MRN [id number]   Admission Date 12/10/2018 Operation Date 12/10/2018   Attending Physician Sahra Lee MD Operating Physician Chelo Delong MD       Date of procedure:     Dece questions at this time    Discussed: The potential risks and benefits of surgery were discussed with the patient as well as family members at the bedside.   These are including but not limited to bleeding, infection, intra-abdominal abscess formation, p sharp dissection. Initial examination of the abdomen revealed a dilated gallbladder with no evidence of gallstones. No gross liver lesions were noted. The stomach was markedly distended. NG tube was in good position.   Approximately 2 L of gastric yong proximal and distal stump. The KELLY stapler was then used to fashion the anastomosis. The anastomotic line was examined. There is no evidence of bleeding. The anastomosis was completed with a TA stapler.   The mesentery was closed with 2-0 Vicryl in a ru

## 2018-12-10 NOTE — CONSULTS
[id number] Patient Status:  Inpatient    1934 MRN [id number]   [facility] 4SW-A Attending Evelyn Carlton MD   Hosp Day # 0 PCP Dorita Braswell MD     Date of Admission: 12/10/2018  Admission Diagnosis: Small vickie Repeat PRN.      • LUMPECTOMY RIGHT  1992   • OTHER SURGICAL HISTORY  2003    colon resection   • OTHER SURGICAL HISTORY  2003    lymph node exc   • OTHER SURGICAL HISTORY  1990's    cardiac stents   • OTHER SURGICAL HISTORY  2003    right breast lumpectomy 50 mg by mouth every 12 (twelve) hours as needed for Pain.  ) Disp: 10 tablet Rfl: 0   GuaiFENesin  MG Oral Tablet 12 Hr Take 1 tablet (600 mg total) by mouth 2 (two) times daily.  Disp: 30 tablet Rfl: 0   furosemide 20 MG Oral Tab Take 0.5 tablets (1 Heart Attack Disp:  Rfl:    brimonidine Tartrate (ALPHAGAN) 0.2 % Ophthalmic Solution Place 1 drop into both eyes 2 (two) times daily.  Disp:  Rfl:         Current Medications:    Current Facility-Administered Medications:   •  norepinephrine (LEVOPHED) 4 m 14/350/5/100%      Wt Readings from Last 3 Encounters:  12/10/18 : 89 lb 15.2 oz (40.8 kg)  10/31/17 : 96 lb (43.5 kg)  10/19/17 : 95 lb 14.4 oz (43.5 kg)       No intake/output data recorded.   I/O this shift:  In: 2000 [I.V.:2000]  Out: 2170 [Urine:150; E mechanical ventilation  -CXR   -repeat ABG  3. CHANDANA: prerenal   -monitor with IVF  4. Anemia: post-op  -monitor   -transfuse to maintain hgb > 7   5. A.Fib: on eliquis as OP  -holding in the post-op setting  6. COPD:  -BD protocol  7.  DM:  -insulin per IM

## 2018-12-10 NOTE — ANESTHESIA POSTPROCEDURE EVALUATION
[id number] [facility] Patient Status:  Inpatient   Age/Gender 80year old female MRN [id number]   [facility] 4SW-A Attending Nell Garcia MD   Hosp Day # 0 PCP Savita Steel MD       Anesthesia Post-op Note    Procedure(s):

## 2018-12-10 NOTE — CONSULTS
[facility]  Report of Consultation    Angela Call Patient Status:  Emergency    1934 MRN [id number]   Location [facility] Attending Eddie Lundberg MD   Hosp Day # 0 PCP Ulices Ayers MD     Date of consultation: without mention of complication, not stated as uncontrolled    • Unspecified essential hypertension      Past Surgical History:   Procedure Laterality Date   • COLONOSCOPY  4/28/11= Polyps (TA)    Repeat PRN.      • LUMPECTOMY RIGHT  1992   • OTHER SURGICAL pruritus and rash, changing pigmented moles, lesions  Musculoskeletal:  Negative for bone/joint symptoms, negative for muscle aches or cramping  Neurological:  Negative for gait disturbance, headaches  Psychiatric:  Negative for inappropriate interaction a The risks and benefits of surgery were reviewed in detail. Specifically the increased risk of intraoperative and perioperative complications were reviewed given CT scan findings.   I also spoke to patient's daughter Misha Austin to explain severity of clinical sit

## 2018-12-10 NOTE — ED PROVIDER NOTES
Patient Seen in: [facility] Emergency Department    History   Patient presents with:  Nausea/Vomiting/Diarrhea (gastrointestinal)  Constipation (gastrointestinal)    Stated Complaint: CONSTIPATION    HPI    77-year-old female presents emergency depa 61.00        Pack years: 30.5      Smokeless tobacco: Never Used      Tobacco comment: smokes once/week     Alcohol use: No    Drug use: No      Review of Systems    Positive for stated complaint: CONSTIPATION  Other systems are as noted in HPI.   Constitut Notable for the following components:    Neutrophil Absolute Manual 7.73 (*)     Lymphocyte Absolute Manual 0.37 (*)     Monocyte Absolute Manual 1.10 (*)     Platelet Morphology See morphology below (*)     Large Platelets Present (*)     All other compon Disposition and Plan     Clinical Impression:  Small bowel obstruction ([facility] [address]  (primary encounter diagnosis)  Mesenteric ischemia ([facility] [address]  Pneumobilia    Disposition:  Admit  12/10/2018  3:04 am    Follow-up:  No follow-up provider specified.

## 2018-12-10 NOTE — BRIEF OP NOTE
Pre-Operative Diagnosis: closed loop bowel obstruction with mesenteric and portal venous air     Post-Operative Diagnosis: same- closed loop obstruction with long segment of small bowel dilated but viable  Second segment of distal ileum with chronic Lance Oven

## 2018-12-10 NOTE — PLAN OF CARE
Received patient from OR at 0630. Placed on monitor, report obtained from Dr. Sage Gupta. Pt is intubated and unresponsive to verbal stimulus, with dong in place, R NG tube, and R arterial line. CXR performed, labs drawn, blood sugar checked.  Upon hooking NG

## 2018-12-10 NOTE — H&P
[address] [facility] Patient Status:  Inpatient    1934 MRN [id number]   [facility] 4SW-A Attending Nell Garcia MD   Hosp Day # 0 PCP Savita Steel MD     History of Present Illness:  Glenn Chinchilla SURGICAL HISTORY  1990's    cardiac stents   • OTHER SURGICAL HISTORY  2003    right breast lumpectomy   • RADIATION RIGHT  1992   • TONSILLECTOMY      as child     Family History   Problem Relation Age of Onset   • Heart Attack Mother       reports that s (two) times daily. Disp: 30 tablet Rfl: 0 Taking   furosemide 20 MG Oral Tab Take 0.5 tablets (10 mg total) by mouth 2 (two) times daily.  (Patient taking differently: Take 20 mg by mouth 2 (two) times daily.  ) Disp: 60 tablet Rfl: 6 Taking   sodium chlori Solution Place 1 drop into both eyes 2 (two) times daily. Disp:  Rfl:  Taking       Review of Systems:  A comprehensive 10 point review of systems was completed. Pertinent positives and negatives noted in the the HPI.     Physical Exam:  Vital signs: Blood ([address]) which includes the Dose Index Registry.   PATIENT STATED HISTORY: (As transcribed by Technologist)  constipation x 2 days   vomiting tonight    FINDINGS:  LIVER:  No enlargement, atrophy, abnormal density, or significant f 6/17/2017, 1:11. INDICATIONS:  ett placement  PATIENT STATED HISTORY: (As transcribed by Technologist)  Patient offered no additional history at this time.     FINDINGS:  Endotracheal tube at the level of the Anahi possibly extending into the proximal rig extremity     Diabetic polyneuropathy associated with type 2 diabetes mellitus (HCC)     Smoking     Lung nodules     Compression fracture of body of thoracic vertebra ([facility] [address]     Depression     Consult 9/27/15 w/, Closed fracture of upper limb

## 2018-12-10 NOTE — PROGRESS NOTES
[facility]      Sepsis Reassessment Note    /63   Pulse 118   Temp 96.7 °F (35.9 °C) (Temporal)   Resp 17   Ht 144.8 cm (4' 9\")   Wt 89 lb 15.2 oz (40.8 kg)   SpO2 (!) 89%   BMI 19.46 kg/m²          Cardiac:  Regularity: Regular  Rate: Tachyc

## 2018-12-10 NOTE — PROGRESS NOTES
[facility] Pharmacy Note:  Renal Adjustment for levofloxacin [facility])    Gerard Jj is a 80year old female who has been prescribed levofloxacin (LEVAQUIN) 750 mg every 24 hrs. CrCl is estimated creatinine clearance is 16.4 mL/min (A) (based on SCr of 1.

## 2018-12-10 NOTE — ED INITIAL ASSESSMENT (HPI)
Pt sent over from [facility] for c/o constipation x2 days w/ vomiting.  Reports getting zofran q4 hours w/ clear vomiting     Given lactulose today

## 2018-12-10 NOTE — PLAN OF CARE
Patient/Family Goals    • Patient/Family Long Term Goal Progressing    • Patient/Family Short Term Goal Progressing        RESPIRATORY - ADULT    • Achieves optimal ventilation and oxygenation Progressing        Safety Risk - Non-Violent Restraints    • Pa

## 2018-12-10 NOTE — ANESTHESIA PREPROCEDURE EVALUATION
PRE-OP EVALUATION    Patient Name: [facility]    Pre-op Diagnosis: Ischemic bowel    Exp Lap    Surgeon: Dr. Doris Myles    Pre-op vitals reviewed. Temp: 98.4 °F (36.9 °C)  Pulse: 102  Resp: 21  BP: 114/72  SpO2: 97 %  Body mass index is 19.48 kg/m².     C for Pain.  ) Disp: 10 tablet Rfl: 0   GuaiFENesin  MG Oral Tablet 12 Hr Take 1 tablet (600 mg total) by mouth 2 (two) times daily. Disp: 30 tablet Rfl: 0   furosemide 20 MG Oral Tab Take 0.5 tablets (10 mg total) by mouth 2 (two) times daily.  (Shellyen (ALPHAGAN) 0.2 % Ophthalmic Solution Place 1 drop into both eyes 2 (two) times daily. Disp:  Rfl:        Allergies: Pcn [Penicillins]; Sulfa Antibiotics      Anesthesia Evaluation    Patient summary reviewed.     Anesthetic Complications           GI/Hepati Years: 61.00        Pack years: 30.5      Smokeless tobacco: Never Used      Tobacco comment: smokes once/week     Alcohol use: No      Drug use: No     Available pre-op labs reviewed.   Lab Results   Component Value Date    WBC 9.2 12/10/2018    RBC 3.92 1

## 2018-12-11 NOTE — PROGRESS NOTES
[facility]  Progress Note    Teri Leal Patient Status:  Inpatient    1934 MRN [id number]   [facility] 4SW-A Attending Siria Lopez MD   Hosp Day # 1 PCP Mague Sparks MD     Subjective:  Patient awake/alert, intubated, Coronary artery disease with hx of myocardial infarct w/o hx of CABG     Hx of colon cancer, stage III     HX: breast cancer     Absolute anemia     History of colon cancer     Stasis dermatitis of both legs     Bilateral edema of lower extremity     Diabe chanel. Doyle Cedillo  12/11/2018 POD 1-  9:15 AM          The patient was seen and examined. Available labs and radiology is noted.   Subjective-   patient remains intubated-failed prior attempt to wean from vent  Examination-   abdomen is mildly di

## 2018-12-11 NOTE — PROGRESS NOTES
[facility] Pharmacy Note:  Renal Adjustment for levofloxacin [facility])    Nettie Alcantar is a 80year old female who has been prescribed levofloxacin (LEVAQUIN) 500 mg every 48 hrs. CrCl is estimated creatinine clearance is 24.2 mL/min (A) (based on SCr of 1.

## 2018-12-11 NOTE — RESPIRATORY THERAPY NOTE
Received patient on full vent support 16/400/40% +5. Weaning parameters and T-Piece attempted, patient became tachypnic after 15 minutes stated she felt it was hard to breath so she was placed back on full support.   CPT via bed was started and resulted in

## 2018-12-11 NOTE — PROGRESS NOTES
[id number] [facility] Patient Status:  Inpatient    1934 MRN [id number]   [facility] 4SW-A Attending Jero Cruz MD   [facility] Day # 1 PCP Nichola Boast, MD     Critical Care Progress Note     Date of Admission: 12/10/201 12/12/2018] levofloxacin in D5W (LEVAQUIN) IVPB premix 500 mg, 500 mg, Intravenous, Q48H  •  metRONIDAZOLE in NaCl (FLAGYL) 5 mg/ml IVPB premix 500 mg, 500 mg, Intravenous, Q8H  •  vasopressin (PITRESSIN) 20 Units in sodium chloride 0.9 % 50 mL infusion fo --    ALT  17  13*   --    BILT  0.2  0.2   --    TP  7.3  5.2*   --      Recent Labs   Lab  12/10/18   0102  12/10/18   0649  12/11/18   0453   RBC  3.92  3.02*  3.06*   HGB  12.1  9.2*  9.0*   HCT  37.0  29.1*  28.9*   MCV  94.4  96.4  94.4   MCH  30.9

## 2018-12-11 NOTE — PAYOR COMM NOTE
--------------  ADMISSION REVIEW     Payor: [employer]  Subscriber #:  [id number]  Authorization Number: N/A    Admit date: 12/10/18  Admit time: 9467       Admitting Physician: Jayjay Leong MD  Attending Physician:  Gera Washington MD  [facility] components:    Neutrophil Absolute Prelim 7.93 (*)     All other components within normal limits   CBC WITH DIFFERENTIAL WITH PLATELET    Narrative: The following orders were created for panel order CBC WITH DIFFERENTIAL WITH PLATELET.   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)     Diabetic nephropathy associated with type 2 diabetes Change 0.6 mcg/kg/hr × 40.8 kg Intravenous Rogerio King RN    12/11/2018 0830 Rate/Dose Change 0.2 mcg/kg/hr × 40.8 kg Intravenous Rogerio King RN    12/11/2018 0800 Rate/Dose Change 0.6 mcg/kg/hr × 40.8 kg Intravenous Rogerio King RN    12/11/ Cindy Batista, RN    12/10/2018 2141 Rate/Dose Verify (none) Intravenous Mac DEN Hernandez    12/10/2018 2000 Rate/Dose Verify (none) Intravenous Giuliano Hernandez RN    12/10/2018 1826 New Bag (none) Intravenous Alden Mancuso RN    12/10/2018 1600 Rate/D Rate/Dose Change 10 mcg/min Intravenous Tamra Bosworth, RN      Pantoprazole Sodium (PROTONIX) 40 mg in Sodium Chloride 0.9 % 10 mL IV push     Date Action Dose Route User    12/11/2018 0629 Given 40 mg Intravenous Davin Kennedy, RN      potassium chlo shock  -continue full mechanical ventilation  -CXR   -repeat ABG  3. CHANDANA: prerenal   -monitor with IVF  4. Anemia: post-op  -monitor   -transfuse to maintain hgb > 7   5. A.Fib: on eliquis as OP  -holding in the post-op setting  6. COPD:  -BD protocol  7.

## 2018-12-12 NOTE — PROGRESS NOTES
[facility]  Progress Note    Morgan King Patient Status:  Inpatient    1934 MRN [id number]   [facility] 4SW-A Attending Anita Rubi MD   Hosp Day # 2 PCP Eunice So MD       SUBJECTIVE:  On vent, awake    OBJECTIVE: rectal suppository 650 mg 650 mg Rectal Q6H PRN   docusate sodium (COLACE) liquid 100 mg 100 mg Oral BID   PEG 3350 (MIRALAX) powder packet 17 g 17 g Oral Daily PRN   bisacodyl (DULCOLAX) rectal suppository 10 mg 10 mg Rectal Daily PRN   norepinephrine (LE 12/10/18 10:53 AM   Result Value Ref Range    Urine Culture  N/A     10-50,000 cfu/ml Three or more species of Gram negative bacilli; none predominant. No further workup performed.    2. BLOOD CULTURE     Status: None (Preliminary result)    Collection Time upper limb     Thoracic aorta atherosclerosis (HCC)     Aortic ectasia (HCC)     Abdominal aortic atherosclerosis (HCC)     DM (diabetes mellitus), type 2, uncontrolled ([address]     BOB (dyspnea on exertion)     Kyphosis of thoracic region, unspecified kyphos

## 2018-12-12 NOTE — PROGRESS NOTES
[facility]  Progress Note    Varsha Hodge Patient Status:  Inpatient    1934 MRN [id number]   [facility] 4SW-A Attending Evelyn Carlton MD   Hosp Day # 1 PCP Dorita Braswell MD       SUBJECTIVE:  On vent, denies discomfort/sudheer 650 mg Oral Q6H PRN   Or      acetaminophen (TYLENOL) 160 MG/5ML oral liquid 650 mg 650 mg Oral Q6H PRN   Or      acetaminophen (TYLENOL) 650 MG rectal suppository 650 mg 650 mg Rectal Q6H PRN   docusate sodium (COLACE) liquid 100 mg 100 mg Oral BID   PEG URINE CULTURE, ROUTINE     Status: None    Collection Time: 12/10/18 10:53 AM   Result Value Ref Range    Urine Culture  N/A     10-50,000 cfu/ml Three or more species of Gram negative bacilli; none predominant. No further workup performed.    2. BLOOD CULT Depression     Consult 9/27/15 w/, Closed fracture of upper limb     Thoracic aorta atherosclerosis (HCC)     Aortic ectasia (HCC)     Abdominal aortic atherosclerosis (HCC)     DM (diabetes mellitus), type 2, uncontrolled ([address]     BOB (dyspnea

## 2018-12-12 NOTE — PROGRESS NOTES
Morgan King Patient Status:  Inpatient    1934 MRN [id number]   [facility] 4SW-A Attending Anita Rubi MD   Hosp Day # 2 PCP Eunice So MD     Critical Care Progress Note      Assessment/Plan:  1.  Septic shock:  bowel at 12/12/2018 0937  Last data filed at 12/12/2018 0701  Gross per 24 hour   Intake 3141 ml   Output 710 ml   Net 2431 ml       /56   Pulse 81   Temp 97.4 °F (36.3 °C) (Temporal)   Resp 16   Ht 4' 9\" (1.448 m)   Wt 97 lb 14.2 oz (44.4 kg)   SpO2 99%

## 2018-12-12 NOTE — PLAN OF CARE
Patient/Family Goals    • Patient/Family Short Term Goal Completed        Safety Risk - Non-Violent Restraints    • Patient will remain free from self-harm Completed          GASTROINTESTINAL - ADULT    • Maintains or returns to baseline bowel function Not

## 2018-12-12 NOTE — PROGRESS NOTES
[facility]  Progress Note    Gerard Jj Patient Status:  Inpatient    1934 MRN [id number]   [facility] 4SW-A Attending Gera Washington MD   Hosp Day # 2 PCP Nadine Zamora MD     Subjective:  Patient awake/alert, intubated, ([facility]     Insulin long-term use (HCC)     Glaucoma of both eyes     Coronary artery disease with hx of myocardial infarct w/o hx of CABG     Hx of colon cancer, stage III     HX: breast cancer     Absolute anemia     History of colon cancer     Stasis derm LULI  12/12/2018   9:15 AM          The patient was seen and examined. Available labs and radiology is noted.   Subjective-   patient intubated-able to nod   Examination-   soft-distended-incision is clean and dry  Assessment/Plan:   POD 2-exploratory lapa

## 2018-12-12 NOTE — PLAN OF CARE
CARDIOVASCULAR - ADULT    • Maintains optimal cardiac output and hemodynamic stability Progressing        Diabetes/Glucose Control    • Glucose maintained within prescribed range Progressing        GASTROINTESTINAL - ADULT    • Maintains or returns to base

## 2018-12-12 NOTE — PROGRESS NOTES
12/12/18 0913   Clinical Encounter Type   Referral To ( scanned POLSt form, dated 2/17/2016, into patient's electronic Resuscitation Wishes/POLST record. )

## 2018-12-12 NOTE — DIETARY NOTE
[facility]    NUTRITION INITIAL ASSESSMENT    Pt does not meet malnutrition criteria.       NUTRITION DIAGNOSIS/PROBLEM:    Altered GI function related to small obstruction and recent bowel resection causing ileus as evidenced by NPO status, NG tube to oz)  09/05/16 : 44.5 kg (98 lb)  08/12/16 : 47.3 kg (104 lb 4.4 oz)  11/14/15 : 41.9 kg (92 lb 4.8 oz)  11/13/15 : 40.4 kg (89 lb)  11/06/15 : 45 kg (99 lb 1.6 oz)  11/04/15 : 45.7 kg (100 lb 12.8 oz)  09/09/15 : 44.5 kg (98 lb)  09/09/15 : 44.5 kg (98 lb)

## 2018-12-12 NOTE — PLAN OF CARE
RESPIRATORY - ADULT    • Achieves optimal ventilation and oxygenation Not Progressing          GASTROINTESTINAL - ADULT    • Maintains or returns to baseline bowel function Progressing        METABOLIC/FLUID AND ELECTROLYTES - ADULT    • Electrolytes maint

## 2018-12-12 NOTE — PROGRESS NOTES
12/12/18 1020   BiPAP   $ RT Standby Charge (per 15 min) 1  (SET UP BI-PAP)   $ Vent Care / Non-Invasive Initial Day Yes   Device V60   Mode Spontaneous/Timed   Interface Full face mask   Mask Size Small   Control Settings   Set Rate 18 breaths/min   Se

## 2018-12-13 NOTE — PROGRESS NOTES
12/13/18 1200   BiPAP   $ RT Standby Charge (per 15 min) 1   Device V60   BiPAP / CPAP CE# v28   Mode Spontaneous/Timed   Interface Face mask   Mask Size Small   Control Settings   Set Rate 18 breaths/min   Set IPAP 14   Set EPAP 8   Oxygen Percent 60 %

## 2018-12-13 NOTE — PLAN OF CARE
Diabetes/Glucose Control    • Glucose maintained within prescribed range Progressing        METABOLIC/FLUID AND ELECTROLYTES - ADULT    • Electrolytes maintained within normal limits Progressing        RESPIRATORY - ADULT    • Achieves optimal ventilation

## 2018-12-13 NOTE — PLAN OF CARE
This writer entered room during CPT treatment during which patient became bradycardic, unresponsive, and ashen/gray. RT called to bedside. CPT stopped and patient bagged. Patient regained consciousness with return to baseline tachycardia. Placed on BiPAP.

## 2018-12-13 NOTE — PROGRESS NOTES
Merlin Silva Patient Status:  Inpatient    1934 MRN [id number]   [facility] 4SW-A Attending Carolina Dietrich MD   Hosp Day # 3 PCP Cheryle Berber, MD     Critical Care Progress Note      Assessment/Plan:  1.  Septic shock:  bowel 12/13/2018 0853  Last data filed at 12/13/2018 0600  Gross per 24 hour   Intake 1381 ml   Output 2275 ml   Net -894 ml       /66   Pulse 103   Temp 98 °F (36.7 °C) (Temporal)   Resp 20   Ht 4' 9\" (1.448 m)   Wt 97 lb 14.2 oz (44.4 kg)   SpO2 96%   B relevant chest imaging in PACS, agree with radiology interpretation except where noted.

## 2018-12-13 NOTE — CM/SW NOTE
Responding to order for patient from LTC at [facility]. Per RN, anand Christian with questions on bed hold at facility. Met with pt and anand Christian at ICU bedside. They confirm plan for return to [facility] upon discharge.  Son asking CM how they can hold pt's

## 2018-12-13 NOTE — PROGRESS NOTES
[facility]  Progress Note    Krunal Hendrickson Patient Status:  Inpatient    1934 MRN [id number]   [facility] 4SW-A Attending Shane Castaneda MD   Hosp Day # 3 PCP Joe Preston MD     Subjective:  No new complaints, minimal incis 2817 [I.V.:1407; IV PIGGYBACK:800]  Out: 4221 [Urine:2450; Emesis/NG output:275]  I/O this shift:  In: 100 [IV PIGGYBACK:100]  Out: 76 [Emesis/NG output:75]    Assessment  Patient Active Problem List:     Lymphedema     Osteoporosis     Essential hypertens for 4 hours due to low NG output as well as patient passing gas and having BM. If tolerating will advance patient to clear liquid diet. 2. Continue TPN  3. Continue Antibiotics  4. DVT prophylaxis  5. Gi prophylaxis  6.  Possible transfer to surgical floor

## 2018-12-13 NOTE — PROGRESS NOTES
[facility]  Progress Note    Lynder Serum Patient Status:  Inpatient    1934 MRN [id number]   [facility] 4SW-A Attending Jayy Duarte MD   Hosp Day # 3 PCP Renard Bhatt MD       SUBJECTIVE:  Extubated yesterday, doing wel 650 mg Oral Q6H PRN   Or      acetaminophen (TYLENOL) 650 MG rectal suppository 650 mg 650 mg Rectal Q6H PRN   docusate sodium (COLACE) liquid 100 mg 100 mg Oral BID   PEG 3350 (MIRALAX) powder packet 17 g 17 g Oral Daily PRN   bisacodyl (DULCOLAX) rectal 12/10/18   0753   TROP  <0.046        Hospital Encounter on 12/10/18   1.  URINE CULTURE, ROUTINE     Status: None    Collection Time: 12/10/18 10:53 AM   Result Value Ref Range    Urine Culture  N/A     10-50,000 cfu/ml Three or more species of Gram negati Compression fracture of body of thoracic vertebra (HCC)     Depression     Consult 9/27/15 w/, Closed fracture of upper limb     Thoracic aorta atherosclerosis (HCC)     Aortic ectasia (HCC)     Abdominal aortic atherosclerosis (HCC)     DM (alana

## 2018-12-13 NOTE — PROGRESS NOTES
12/13/18 0406   BiPAP   $ RT Standby Charge (per 15 min) 1   $ BiPAP in use daily Yes   Device V60   BiPAP / CPAP CE# v28   Mode Spontaneous/Timed   Interface Full face mask   Mask Size Small   Control Settings   Set Rate 18 breaths/min   Set IPAP 14

## 2018-12-14 NOTE — PLAN OF CARE
12/13 1930: Pt received AOx3. 3L NC. Productive cough, using yankauer to suction self with whitish secretions. ST. TPN. L PICC. NGT to low intermittent suction. Abd. dsg D/I. Female external catheter. 2200: Pt desat and placed back to BIPAP.  BIPAP tolera

## 2018-12-14 NOTE — PROGRESS NOTES
12/14/18 1535   BiPAP   $ RT Standby Charge (per 15 min) 1   Device V60   BiPAP / CPAP CE# v28   Mode AVAPS   Interface Full face mask   Mask Size Small   Control Settings   Set Rate 16 breaths/min   Set EPAP 5   Oxygen Percent 40 %   Inspiratory time 1

## 2018-12-14 NOTE — PLAN OF CARE
RESPIRATORY - ADULT    • Achieves optimal ventilation and oxygenation Not Progressing          CARDIOVASCULAR - ADULT    • Maintains optimal cardiac output and hemodynamic stability Progressing        Diabetes/Glucose Control    • Glucose maintained within

## 2018-12-14 NOTE — PROGRESS NOTES
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 Greater than half of our visit was spent in counseling the patient on the above listed diagnoses and treatment options.     Avtar Boyer  12/14/2018  11:26 AM

## 2018-12-14 NOTE — DIETARY NOTE
Nutrition Short Note:     Parenteral Nutrition - TPN infusing without diffculty. Tonight's  TPN provides 80 grams protein, 750 calories from dextrose, 430 calories from lipid, 29% lipid and 5 units of Insulin in 1500 ml fluid.  GIR: 3.5 mg/kg/min  This will

## 2018-12-14 NOTE — PHYSICAL THERAPY NOTE
Attempted to see pt this AM. Pt demonstrating continued respiratory instability. RN had to put her back on BiPAP. Will hold at this time. Will re-attempt as schedule and medical stability allow.

## 2018-12-14 NOTE — PROGRESS NOTES
12/14/18 0518   BiPAP   $ RT Standby Charge (per 15 min) 1   Device V60   BiPAP / CPAP CE# v28   Mode AVAPS   Interface Face mask   Mask Size Small   Control Settings   Set Rate 16 breaths/min   Set EPAP 5   Oxygen Percent 40 %   Inspiratory time 1   In

## 2018-12-14 NOTE — PROGRESS NOTES
Critical Care Progress Note        NAME: Cedric Mcardle - ROOM: [address] [place] [address] - MRN: [id number] - Age: 80year old - : 1934  Date of Admission: 12/10/2018 12:55 AM  Admission Diagnosis: Small bowel obstruction ([facility] [address] [K56.609]  Pneumobilia [K83.8]  Mes Medication:ipratropium-albuterol, dextrose, ondansetron HCl, acetaminophen **OR** acetaminophen **OR** acetaminophen, PEG 3350, bisacodyl, vasopressin (PITRESSIN) infusion for shock     Lungs: diminished breath sounds bilaterally  Heart: S1, S2 normal, no diuresis  4. Anemia: post-op  -monitor   -transfuse to maintain hgb > 7   5. A.Fib: on eliquis as OP  -holding in the post-op setting  6. COPD:  -BD protocol  7. DM:  -insulin per IM  8. HTN:  -holding antihypertensives in setting of shock  9.  FEN:  -NPO,

## 2018-12-14 NOTE — PROGRESS NOTES
[facility]  Progress Note    Chick Landau Patient Status:  Inpatient    1934 MRN [id number]   [facility] 4SW-A Attending Danish Montana MD   Hosp Day # 4 PCP Luna Armijo MD       SUBJECTIVE:  On BIPAP, had an episode of re packet 17 g 17 g Oral Daily PRN   bisacodyl (DULCOLAX) rectal suppository 10 mg 10 mg Rectal Daily PRN   norepinephrine (LEVOPHED) 4 mg/250 ml premix infusion 0.5-8 mcg/min Intravenous Continuous   Pantoprazole Sodium (PROTONIX) 40 mg in Sodium Chloride 0. Culture  N/A     10-50,000 cfu/ml Three or more species of Gram negative bacilli; none predominant. No further workup performed.    2. BLOOD CULTURE     Status: None (Preliminary result)    Collection Time: 12/10/18  9:30 AM   Result Value Ref Range    Bloo atherosclerosis ([address]     DM (diabetes mellitus), type 2, uncontrolled ([address]     BOB (dyspnea on exertion)     Kyphosis of thoracic region, unspecified kyphosis type     Compression fracture of fourth lumbar vertebra with delayed healing     Compression fr

## 2018-12-15 NOTE — PLAN OF CARE
CARDIOVASCULAR - ADULT    • Maintains optimal cardiac output and hemodynamic stability Progressing          Diabetes/Glucose Control    • Glucose maintained within prescribed range Progressing          GASTROINTESTINAL - ADULT    • Maintains or returns to

## 2018-12-15 NOTE — PROGRESS NOTES
Critical Care Progress Note        NAME: Jossue Cardona - ROOM: 941/157-I - MRN: [id number] - Age: 80year old - : 1934  Date of Admission: 12/10/2018 12:55 AM  Admission Diagnosis: Small bowel obstruction ([facility] [address] [K56.609]  Pneumobilia [K83.8]  Mes ondansetron HCl, acetaminophen **OR** acetaminophen **OR** acetaminophen, PEG 3350, bisacodyl, vasopressin (PITRESSIN) infusion for shock     Lungs: diminished breath sounds anterior - bilateral  Heart: S1, S2 normal, no murmur, click, rub or gallop, regul tolerated  3. CHANDANA: prerenal   -stable  -monitor w/ diuresis  4. Anemia: post-op  -monitor   -transfuse to maintain hgb > 7   5. A.Fib: on eliquis as OP  -holding in the post-op setting  6. COPD:  -BD protocol  7. DM:  -insulin per IM  8.  HTN:  -holding ant

## 2018-12-15 NOTE — PROGRESS NOTES
[facility]  Progress Note    Ladarius Cluster Patient Status:  Inpatient    1934 MRN [id number]   [facility] 4SW-A Attending Pacheco Paul MD   Hosp Day # 5 PCP Tami Kurtz MD       SUBJECTIVE:   Still On BIPAP, Asking for Fo acetaminophen (TYLENOL) 650 MG rectal suppository 650 mg 650 mg Rectal Q6H PRN   PEG 3350 (MIRALAX) powder packet 17 g 17 g Oral Daily PRN   bisacodyl (DULCOLAX) rectal suppository 10 mg 10 mg Rectal Daily PRN   norepinephrine (LEVOPHED) 4 mg/250 ml bipin Result Value Ref Range    Urine Culture  N/A     10-50,000 cfu/ml Three or more species of Gram negative bacilli; none predominant. No further workup performed.    2. BLOOD CULTURE     Status: None    Collection Time: 12/10/18  9:30 AM   Result Value Ref atherosclerosis ([address]     Aortic ectasia (HCC)     Abdominal aortic atherosclerosis (HCC)     DM (diabetes mellitus), type 2, uncontrolled ([address]     BOB (dyspnea on exertion)     Kyphosis of thoracic region, unspecified kyphosis type     Compression fractu

## 2018-12-15 NOTE — DIETARY NOTE
Nutrition Short Note:     Parenteral Nutrition - TPN infusing without diffculty. Tonight's  TPN provides 80 grams protein, 750 calories from dextrose, 430 calories from lipid, 29% lipid and 10 units of Insulin in 1500 ml fluid.  GIR: 3.5 mg/kg/min  This lisa

## 2018-12-15 NOTE — PLAN OF CARE
GASTROINTESTINAL - ADULT    • Maintains or returns to baseline bowel function Not Progressing    • Maintains adequate nutritional intake (undernourished) Not Progressing          RESPIRATORY - ADULT    • Achieves optimal ventilation and oxygenation Not Pro

## 2018-12-15 NOTE — PROGRESS NOTES
[facility]  Progress Note    Jah Reese Patient Status:  Inpatient    1934 MRN [id number]   [facility] 4SW-A Attending Car Quintero MD   Hosp Day # 5 PCP Miguelina Lindo MD     Subjective:  Patient remains on BiPAP mask. post detorsion closed-loop bowel obstruction, partial small bowel resection  NG tube output is minimal however patient remains I by mask-at this time to prevent risk of aspiration  Patient on TPN for nutritional support  Increase activity and mobility as t

## 2018-12-16 NOTE — PROGRESS NOTES
[facility]  Progress Note    Alexandria Moser Patient Status:  Inpatient    1934 MRN [id number]   [facility] 4SW-A Attending Irma Finn MD   Hosp Day # 6 PCP Elizabet Figueroa MD     Subjective:  Patient remains on oxygen mask, ALB  1.9*  2.0*  2.2*   NA  144  143  144   K  4.2  4.2  4.0  3.9   CL  111  109  109   CO2  27.0  28.0  30.0   ALKPHO  61  51*  53*   AST  14*  13*  17   ALT  18  13*  14   BILT  0.2  0.2  0.2   TP  4.8*  4.8*  5.2*         Assessment/Plan:   POD 6 - st

## 2018-12-16 NOTE — PLAN OF CARE
RESPIRATORY - ADULT    • Achieves optimal ventilation and oxygenation Not Progressing          Impaired Functional Mobility    • Achieve highest/safest level of mobility/gait Not Progressing          CARDIOVASCULAR - ADULT    • Maintains optimal cardiac ou

## 2018-12-16 NOTE — PLAN OF CARE
POLST form redone with POA and signed by MD: DNR but ok for CPAP/BIPAP/AVAPS and meds. No intubation.

## 2018-12-16 NOTE — PROGRESS NOTES
12/16/18 1535   Clinical Encounter Type   Visited With Health care provider;Family; Patient   Routine Visit Follow-up  ( received fully executed POLST from RN.   provided POA with copy and received POLST for scanning into the system)   Con

## 2018-12-16 NOTE — PROGRESS NOTES
[facility]  Progress Note    Nettie Alcantar Patient Status:  Inpatient    1934 MRN [id number]   [facility] 4SW-A Attending Ana Chairez MD   Hosp Day # 6 PCP Stepan Bravo MD       SUBJECTIVE:   Tried aerosol mask this AM no (TYLENOL) 650 MG rectal suppository 650 mg 650 mg Rectal Q6H PRN   PEG 3350 (MIRALAX) powder packet 17 g 17 g Oral Daily PRN   bisacodyl (DULCOLAX) rectal suppository 10 mg 10 mg Rectal Daily PRN   norepinephrine (LEVOPHED) 4 mg/250 ml premix infusion 0.5- Gram negative bacilli; none predominant. No further workup performed. 2. BLOOD CULTURE     Status: None    Collection Time: 12/10/18  9:30 AM   Result Value Ref Range    Blood Culture Result No Growth 5 Days N/A   3.  BODY FLUID CULT,AEROBIC AND ANAEROBIC (diabetes mellitus), type 2, uncontrolled ([address]     BOB (dyspnea on exertion)     Kyphosis of thoracic region, unspecified kyphosis type     Compression fracture of fourth lumbar vertebra with delayed healing     Compression fracture of first lumbar verteb

## 2018-12-16 NOTE — PROGRESS NOTES
12/16/18 1325   Clinical Encounter Type   Visited With Health care provider   Routine Visit ( responded to page from RN to initiate POLST completion/execution.  prepared POLST form with basic patient information.  RN indicated appropriate

## 2018-12-16 NOTE — PHYSICAL THERAPY NOTE
PHYSICAL THERAPY EVALUATION - INPATIENT     Room Number: 461/461-A  Evaluation Date: 12/16/2018  Type of Evaluation: Initial  Physician Order: PT Eval and Treat    Presenting Problem: SBO  Reason for Therapy: Mobility Dysfunction and Discharge [place] at [address] [facility] MAIN OR   • LUMPECTOMY RIGHT  1992   • OTHER SURGICAL HISTORY  2003    colon resection   • OTHER SURGICAL HISTORY  2003    lymph node exc   • OTHER SURGICAL HISTORY  1990's    cardiac stents   • OTHER SURGICAL HISTORY  2003    right breast lumpectomy O2 WALK                  AM-PAC '6-Clicks' INPATIENT SHORT FORM - BASIC MOBILITY  How much difficulty does the patient currently have. ..  -   Turning over in bed (including adjusting bedclothes, sheets and blankets)?: Unable   -   Sitting down on an pain.  Functional outcome measures completed include AMPAC. Based on this evaluation, patient's clinical presentation is evolving and overall the evaluation complexity is considered moderate.   These impairments and comorbidities manifest themselves as fun

## 2018-12-16 NOTE — PLAN OF CARE
RESPIRATORY - ADULT    • Achieves optimal ventilation and oxygenation Not Progressing          CARDIOVASCULAR - ADULT    • Maintains optimal cardiac output and hemodynamic stability Progressing        Diabetes/Glucose Control    • Glucose maintained within hold off due to airway issues and secretions, discussed with Lissette Poon from speech therapy.

## 2018-12-16 NOTE — SLP NOTE
Order received for BSSE. Pt last seen by our service in June of 2017. A VFSS was performed on June 23, 2017 which revealed a functional oral swallow, regular and thin liquids was recommended at that time. Today, the pt is currently in the ICU.  Pt admitte

## 2018-12-16 NOTE — PROGRESS NOTES
Critical Care Progress Note        NAME: Ethyl Seat - ROOM: 365/366-T - MRN: [id number] - Age: 80year old - : 1934  Date of Admission: 12/10/2018 12:55 AM  Admission Diagnosis: Small bowel obstruction ([facility] [address] [K56.609]  Pneumobilia [K83.8]  Mes (PITRESSIN) infusion for shock       PRN Medication:dextrose, ondansetron HCl, acetaminophen **OR** acetaminophen **OR** acetaminophen, PEG 3350, bisacodyl, vasopressin (PITRESSIN) infusion for shock     Lungs: diminished breath sounds anterior - bilateral further diuresis today with close monitoring of Is and Os  -thick secretions noted in upper airway, cont hypertonic saline, start mucinex once taking PO  3. CHANDANA: prerenal   -stable  -monitor w/ diuresis  4.  Anemia: post-op  -monitor   -transfuse to [facility]

## 2018-12-16 NOTE — RESPIRATORY THERAPY NOTE
Received pt on AVAPS 450/16/10-30/+5/40%, pt taken off AVAPS around midnight and placed on 2L NC, pt tolerating well of AVAPS, neb tx Q4 VA/NaCl, will continue to monitor

## 2018-12-17 NOTE — SLP NOTE
ADULT SWALLOWING EVALUATION    ASSESSMENT    ASSESSMENT/OVERALL IMPRESSION:  Pt seen this PM for bedside swallow evaluation. Initially attempted to see pt for speech assessment this AM, however, at that time RN reporting decreased management of secretions. Recommendations/Plan: Undetermined    HISTORY   MEDICAL HISTORY  Reason for Referral: R/O aspiration(difficulty swallowing after intubation)    Problem List  Principal Problem:    Small bowel obstruction ([address]  Active Problems:    Mesenteric ischemia (HCC) of Motion: Within Functional Limits    Voice Quality: Clear  Respiratory Status: Supplemental O2;Nasal cannula  Consistencies Trialed:  Thin liquids  Method of Presentation: Self presentation;Cup;Single sips  Patient Positioning: Upright;Standard chair    O

## 2018-12-17 NOTE — PROGRESS NOTES
Farhad Serum Patient Status:  Inpatient    1934 MRN [id number]   [facility] 4SW-A Attending Jayy Duarte MD   Hosp Day # 7 PCP Renard Bhatt MD     Critical Care Progress Note      Assessment/Plan:  1.  Septic shock:  bowel 12/17/2018 0941  Last data filed at 12/17/2018 0657  Gross per 24 hour   Intake 2209 ml   Output 850 ml   Net 1359 ml       /62   Pulse 95   Temp 97.8 °F (36.6 °C) (Temporal)   Resp 26   Ht 4' 9\" (1.448 m)   Wt 100 lb 1.4 oz (45.4 kg)   SpO2 97%   B

## 2018-12-17 NOTE — PROGRESS NOTES
12/17/18 1000   Clinical Encounter Type   Referral To Physician;Nurse  (Laurel Alaniz filed 2018 POLST form into patient's electronic Resuscitation Wishes/POLST record. )

## 2018-12-17 NOTE — PROGRESS NOTES
12/17/18 0955   Clinical Encounter Type   Referral To Nurse;Physician  ( rescinded 2016 POLST form in patient's electronic Resuscitation Wishes/POLST record. )

## 2018-12-17 NOTE — PROGRESS NOTES
[facility]  Progress Note    Caesar Means Patient Status:  Inpatient    1934 MRN [id number]   [facility] 4SW-A Attending Brand Officer, MD   Hosp Day # 7 PCP Dangelo Steve MD     Subjective:  No new complaints.  No nausea/vom ([address]     Glaucoma of both eyes     Coronary artery disease with hx of myocardial infarct w/o hx of CABG     Hx of colon cancer, stage III     HX: breast cancer     Absolute anemia     History of colon cancer     Stasis dermatitis of both legs     Bilatera

## 2018-12-17 NOTE — PLAN OF CARE
Received patient at 0700. A&Ox3. AVSS. Up to chair with PT/OT spent approx 4 hours in chair, back to bed with x2 max assist. Spent most of day on NC, used BiPAP twice for short break/WOB. Precedex gtt decreased and tolerating well. Secretions decreased.  Mary Marie

## 2018-12-17 NOTE — PROGRESS NOTES
12/17/18 2358   BiPAP   $ RT Standby Charge (per 15 min) 1   $ Vent Care / Non-Invasive Subsequent Day Yes   Device V60   Mode AVAPS   Interface Full face mask   Control Settings   Set Rate 16 breaths/min   Set EPAP 5   Oxygen Percent 30 %   Inspiratory

## 2018-12-17 NOTE — PROGRESS NOTES
[facility]  Progress Note    Jah Reese Patient Status:  Inpatient    1934 MRN [id number]   [facility] 4SW-A Attending Car Quintero MD   Hosp Day # 7 PCP Miguelina Lindo MD       SUBJECTIVE:   Awake, asking for liquid  Did mg 650 mg Rectal Q6H PRN   PEG 3350 (MIRALAX) powder packet 17 g 17 g Oral Daily PRN   bisacodyl (DULCOLAX) rectal suppository 10 mg 10 mg Rectal Daily PRN   norepinephrine (LEVOPHED) 4 mg/250 ml premix infusion 0.5-8 mcg/min Intravenous Continuous   metRO Result Value Ref Range    Blood Culture Result No Growth 5 Days N/A   3.  BODY FLUID CULT,AEROBIC AND ANAEROBIC     Status: None    Collection Time: 12/10/18  5:14 AM   Result Value Ref Range    Body Fluid Culture Result No Growth 5 Days N/A    Body Fld S healing     Compression fracture of first lumbar vertebra with delayed healing     Small bowel obstruction (HCC)     Nosocomial pneumonia     At risk for falling     SBO (small bowel obstruction) (HCC)     Nausea and vomiting in adult     Hyponatremia

## 2018-12-17 NOTE — PLAN OF CARE
GASTROINTESTINAL - ADULT    • Maintains or returns to baseline bowel function Progressing    • Maintains adequate nutritional intake (undernourished) Progressing          RESPIRATORY - ADULT    • Achieves optimal ventilation and oxygenation Progressing

## 2018-12-17 NOTE — DIETARY NOTE
[facility]    NUTRITION INITIAL ASSESSMENT    Pt does not meet malnutrition criteria.       NUTRITION DIAGNOSIS/PROBLEM:    Altered GI function related to small obstruction and recent bowel resection causing ileus as evidenced by NPO status, NG tube to oz)  10/31/17 : 43.5 kg (96 lb)  10/19/17 : 43.5 kg (95 lb 14.4 oz)  09/22/17 : 43.5 kg (96 lb)  06/22/17 : 47 kg (103 lb 9.9 oz)  09/05/16 : 44.5 kg (98 lb)  08/12/16 : 47.3 kg (104 lb 4.4 oz)  11/14/15 : 41.9 kg (92 lb 4.8 oz)  11/13/15 : 40.4 kg (89 lb)

## 2018-12-18 NOTE — PLAN OF CARE
CARDIOVASCULAR - ADULT    • Maintains optimal cardiac output and hemodynamic stability Progressing        GENITOURINARY - ADULT    • Absence of urinary retention Progressing        RESPIRATORY - ADULT    • Achieves optimal ventilation and oxygenation Progr

## 2018-12-18 NOTE — PROGRESS NOTES
12/18/18 1555   BiPAP   $ RT Standby Charge (per 15 min) 1  (neb tx, bipap check)   Device V60   BiPAP / CPAP CE# v28   Mode AVAPS   Interface Full face mask   Mask Size Small   Control Settings   Set Rate 16 breaths/min   Set EPAP 5   Oxygen Percent 30

## 2018-12-18 NOTE — PROGRESS NOTES
[id number] [facility] Patient Status:  Inpatient    1934 MRN [id number]   [facility] 4SW-A Attending Jamil Mendoza MD   Hosp Day # 8 PCP Amparo Jack MD     SUBJECTIVE:Pt with decreased mental status overnight, ABG  PRN **OR** acetaminophen (TYLENOL) 650 MG rectal suppository 650 mg, 650 mg, Rectal, Q6H PRN  •  PEG 3350 (MIRALAX) powder packet 17 g, 17 g, Oral, Daily PRN  •  bisacodyl (DULCOLAX) rectal suppository 10 mg, 10 mg, Rectal, Daily PRN  •  norepinephrine (LE setting  6. COPD:  -BD protocol  7. DM:  -insulin per IM  8. HTN:  - per IM  9. FEN:  -NPO, on TPN  10. Proph:  -heparin  -protonix  11.  Dispo:  -DNR  -ICU given recurrent hypercapneic respiratory failure overnight requiring reintiation of AVAPS  -plan to

## 2018-12-18 NOTE — SLP NOTE
Attempted to see pt for speech therapy services- pt on BiPap. Will continue to follow. Thank you.     Benjie Lutz M.S. [id number] [facility]  Pager [phone]

## 2018-12-18 NOTE — PROGRESS NOTES
Was called back to patient's room for acute change in mental status--patient was just seen by me approximately 15-20 minutes prior and awake, talking. Returned to room at 1900, patient breathing and with pulse but unresponsive.   RN reports patient doing t

## 2018-12-18 NOTE — PHYSICAL THERAPY NOTE
PHYSICAL THERAPY TREATMENT NOTE - INPATIENT    Room Number: 461/461-A     Session: 1     Number of Visits to Meet Established Goals: 5    Presenting Problem: SBO    History related to current admission: pt has hx of COPD, severe kyphoscoliosis, HTN, CAD OTHER SURGICAL HISTORY  2003    colon resection   • OTHER SURGICAL HISTORY  2003    lymph node exc   • OTHER SURGICAL HISTORY  1990's    cardiac stents   • OTHER SURGICAL HISTORY  2003    right breast lumpectomy   • RADIATION RIGHT  1992   • TONSILLECTOMY to and from a bed to a chair (including a wheelchair)?: Total   -   Need to walk in hospital room?: Total   -   Climbing 3-5 steps with a railing?: Total       AM-PAC Score:  Raw Score: 6   Approx Degree of Impairment: 100%   Standardized Score (AM-PAC Sca dec in endurance. The AM-PAC '6-Clicks' Inpatient Basic Mobility Short Form was completed and this patient is demonstrating a 100% degree of impairment in mobility. Research supports that patients with this level of impairment may benefit from LTC.

## 2018-12-18 NOTE — OCCUPATIONAL THERAPY NOTE
OCCUPATIONAL THERAPY EVALUATION - INPATIENT     Room Number: 461/461-A  Evaluation Date: 12/17/2018  Type of Evaluation: Initial  Presenting Problem: Septic shock d/t bowel ischemia s/p emergent ex-lap 12/10/18; acute respiratory failure    Physician Order mellitus without mention of complication, not stated as uncontrolled    • Unspecified essential hypertension        Past Surgical History  Past Surgical History:   Procedure Laterality Date   • COLONOSCOPY  4/28/11= Polyps (TA)    Repeat PRN.      • EXPLORA read clock on wall WFL    PERCEPTION  Overall Perception Status:   WFL - within functional limits    SENSATION  Light touch:  intact    Communication: WFL    Behavioral/Emotional/Social: Cooperative and pleasant    RANGE OF MOTION AND STRENGTH ASSESSMENT Egress mode w/ max A of 2. Stand-pivot transfer to recliner chair with max A of 2. Combed hair with set-up while in supported sitting. Facilitated AAROM to BUE in all planes: 5 reps each. Patient agreeable to sitting in chair.   Able to active call l significant modifications of tasks    Clinical Decision Making HIGH - Analysis of occupational profile, comprehensive assessments, multiple treatment options    Overall Complexity HIGH     OT Discharge Recommendations: Long term care  OT Device Recommendat

## 2018-12-18 NOTE — PROGRESS NOTES
[facility]  Progress Note    Alesha Mcmullen Patient Status:  Inpatient    1934 MRN [id number]   [facility] 4SW-A Attending Juan Fong MD   Hosp Day # 8 PCP Mora Mcghee MD       SUBJECTIVE:   Awake, asking for liquid  Did acetaminophen (TYLENOL) 650 MG rectal suppository 650 mg 650 mg Rectal Q6H PRN   PEG 3350 (MIRALAX) powder packet 17 g 17 g Oral Daily PRN   bisacodyl (DULCOLAX) rectal suppository 10 mg 10 mg Rectal Daily PRN   norepinephrine (LEVOPHED) 4 mg/250 ml bipin bacilli; none predominant. No further workup performed. 2. BLOOD CULTURE     Status: None    Collection Time: 12/10/18  9:30 AM   Result Value Ref Range    Blood Culture Result No Growth 5 Days N/A   3.  BODY FLUID CULT,AEROBIC AND ANAEROBIC     Status: N exertion)     Kyphosis of thoracic region, unspecified kyphosis type     Compression fracture of fourth lumbar vertebra with delayed healing     Compression fracture of first lumbar vertebra with delayed healing     Small bowel obstruction (HCC)     Nosoco

## 2018-12-18 NOTE — PAYOR COMM NOTE
--------------  CONTINUED STAY REVIEW        12/18    icu  pt with decreased mental status overnight, ABG drawn and notable for acute hypercapnea for which pt was placed on AVAPS with improvement in mentation.   This morning pt remains on AVAPS, denies comp

## 2018-12-18 NOTE — PROGRESS NOTES
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 Greater than half of our visit was spent in counseling the patient on the above listed diagnoses and treatment options. Lynn Pyle  12/18/2018  12:28 PM        The patient was seen and examined. Available labs and radiology is noted.   [place]

## 2018-12-18 NOTE — DIETARY NOTE
Nutrition Short Note:     Parenteral Nutrition - TPN infusing without difficulty, noted increased blood sugars will increase Insulin in tonight's bag of TPN and increase fluid volume.    Tonight's TPN remains at goal: 80 grams protein, 750 calories from dex

## 2018-12-18 NOTE — PROGRESS NOTES
12/18/18 0517   BiPAP   $ RT Standby Charge (per 15 min) 1   Device V60   BiPAP / CPAP CE# v28   Mode AVAPS   Interface Full face mask   Mask Size Small   Control Settings   Set Rate 16 breaths/min   Set EPAP 5   Oxygen Percent 30 %   Inspiratory time 1

## 2018-12-19 NOTE — PHYSICAL THERAPY NOTE
PHYSICAL THERAPY TREATMENT NOTE - INPATIENT    Room Number: 461/461-A     Session: 1   Number of Visits to Meet Established Goals: 5    Presenting Problem: SBO     History related to current admission: pt has hx of COPD, severe kyphoscoliosis, HTN, CAD, a cardiac stents   • OTHER SURGICAL HISTORY  2003    right breast lumpectomy   • RADIATION RIGHT  1992   • TONSILLECTOMY      as child       SUBJECTIVE  \"That was harder today because my back hurts\"    Since last inpt PT session: pt reports compliance w STATUS  Gait Assessment   Gait Assistance: Not tested           Stoop/Curb Assistance: Not tested       Skilled Therapy Provided: Pt completed supine to sit w/ max a of 1. Once in sitting, pt noting increasing back pain.   Pt having difficulty gaining sitt #4     Goal #5     Goal #6     Goal Comments: Goals established on 12/16/2018 12/19/2018 all goals ongoing.

## 2018-12-19 NOTE — PLAN OF CARE
1930: Care assumed on patient. Patient remained on Bipap with precedex gtt infusing during the night. Patient A & O to self & place, forgetful at times. Hemodynamics remained stable. Abdominal incision C/D/I. Patient turned q2 hrs.   [facility] bath completed

## 2018-12-19 NOTE — DIETARY NOTE
Nutrition Short Note:     Parenteral Nutrition - TPN infusing without difficulty, noted blood sugars better this am. Tonight's TPN remains at goal: 80 grams protein, 750 calories from dextrose, 430 calories from lipid, 29% lipid and 15 units of Insulin in

## 2018-12-19 NOTE — SLP NOTE
SPEECH DAILY NOTE - INPATIENT    ASSESSMENT & PLAN   ASSESSMENT  Pt seen this PM for meal follow up session. RN reporting concern for decreased tolerance of diet with concern for aspiration. Pt has remained NPO since difficulty observed.  Pt sitting upright

## 2018-12-19 NOTE — PROGRESS NOTES
[facility]  Progress Note    Caesar Means Patient Status:  Inpatient    1934 MRN [id number]   [facility] 4SW-A Attending Reji Officer, MD   Hosp Day # 9 PCP Dangelo Steve MD     Subjective:  Awake and alert.  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

## 2018-12-19 NOTE — PLAN OF CARE
Impaired Swallowing    • Minimize aspiration risk Not Progressing          RESPIRATORY - ADULT    • Achieves optimal ventilation and oxygenation Progressing          Patient has continued to tolerate 3L NC.   Concerns for possible aspiration, discussed with

## 2018-12-19 NOTE — PROGRESS NOTES
[id number] [facility] Patient Status:  Inpatient    1934 MRN [id number]   [facility] 4SW-A Attending Dameon Gandara MD   Hosp Day # 9 PCP Juventino Andesron MD     Pulm / Critical Care Progress Note     S: weaned off avaps t abnormality, atraumatic. Lungs: coarse upper airway sounds   Chest wall: No tenderness or deformity. Heart: Regular rate and rhythm, normal S1S2, no murmur. Abdomen: soft, non-tender, non-distended, positive BS.    Extremity: no edema       Recent Lab

## 2018-12-20 NOTE — PLAN OF CARE
Diabetes/Glucose Control    • Glucose maintained within prescribed range Progressing          GASTROINTESTINAL - ADULT    • Maintains or returns to baseline bowel function Progressing    • Maintains adequate nutritional intake (undernourished) Progressing

## 2018-12-20 NOTE — PHYSICAL THERAPY NOTE
PHYSICAL THERAPY TREATMENT NOTE - INPATIENT    Room Number: 461/461-A     Session: 2  Number of Visits to Meet Established Goals: 5    Presenting Problem: SBO     History related to current admission: pt has hx of COPD, severe kyphoscoliosis, HTN, CAD, an cardiac stents   • OTHER SURGICAL HISTORY  2003    right breast lumpectomy   • RADIATION RIGHT  1992   • TONSILLECTOMY      as child       SUBJECTIVE  \"I'm hungry, I'm waiting for that test\"    Since last inpt PT session: pt reports partial compliance 28.58   CMS Modifier (G-Code): CM    FUNCTIONAL ABILITY STATUS  Gait Assessment   Gait Assistance: Not tested           Stoop/Curb Assistance: Not tested       Skilled Therapy Provided: Pt had just returned to bed per rn and pt.   Pt waiting for swallow sheba 12/16/2018 12/20/2018 all goals ongoing.

## 2018-12-20 NOTE — VASCULAR ACCESS
Vascular Access consult regarding Xray report of PICC in RA. It appears the PICC has been in stable position since being repositioned on 12/11. Recommendation at this time is to reposition PICC from current 3.5cm exposed to 5cm exposed.  This has been discu

## 2018-12-20 NOTE — PROGRESS NOTES
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 Continue TPN    My total face time with this patient was 38 minutes. Greater than half of our visit was spent in counseling the patient on the above listed diagnoses and treatment options.     Carol Doe PA-C  12/20/2018  9:23 AM POD 8-      The

## 2018-12-20 NOTE — PROGRESS NOTES
[facility]  Progress Note    Jett Fuelling Patient Status:  Inpatient    1934 MRN [id number]   [facility] 4SW-A Attending Dameon Gandara MD   Hosp Day # 9 PCP Juventino Anderson MD       SUBJECTIVE:   Awake, Wants to eat  Off BIPA Oral Q6H PRN   Or      acetaminophen (TYLENOL) 160 MG/5ML oral liquid 650 mg 650 mg Oral Q6H PRN   Or      acetaminophen (TYLENOL) 650 MG rectal suppository 650 mg 650 mg Rectal Q6H PRN   PEG 3350 (MIRALAX) powder packet 17 g 17 g Oral Daily PRN   bisacody URINE CULTURE, ROUTINE     Status: None    Collection Time: 12/10/18 10:53 AM   Result Value Ref Range    Urine Culture  N/A     10-50,000 cfu/ml Three or more species of Gram negative bacilli; none predominant. No further workup performed.    2. BLOOD CULT w/, Closed fracture of upper limb     Thoracic aorta atherosclerosis (HCC)     Aortic ectasia (HCC)     Abdominal aortic atherosclerosis (HCC)     DM (diabetes mellitus), type 2, uncontrolled ([address]     BOB (dyspnea on exertion)     Kyphosis of t

## 2018-12-20 NOTE — PROGRESS NOTES
[address] [facility] dosing service    Initial Pharmacokinetic Consult for Vancomycin Dosing     Riana Grant is a 80year old female who is being treated for post-op wound infection .   Patient is currently on day #11 of Flagyl and Levaquin which are now being

## 2018-12-20 NOTE — PROGRESS NOTES
[facility]  Progress Note    Alesha Mcmullen Patient Status:  Inpatient    1934 MRN [id number]   [facility] 4SW-A Attending Juan Fong MD   Hosp Day # 8 PCP Mora Mcghee MD       SUBJECTIVE:   Awake, Wants to eat  Off BIP acetaminophen (TYLENOL) 160 MG/5ML oral liquid 650 mg 650 mg Oral Q6H PRN   Or      acetaminophen (TYLENOL) 650 MG rectal suppository 650 mg 650 mg Rectal Q6H PRN   PEG 3350 (MIRALAX) powder packet 17 g 17 g Oral Daily PRN   bisacodyl (DULCOLAX) rectal s 12/10/18   1. URINE CULTURE, ROUTINE     Status: None    Collection Time: 12/10/18 10:53 AM   Result Value Ref Range    Urine Culture  N/A     10-50,000 cfu/ml Three or more species of Gram negative bacilli; none predominant. No further workup performed. Depression     Consult 9/27/15 w/, Closed fracture of upper limb     Thoracic aorta atherosclerosis (HCC)     Aortic ectasia (HCC)     Abdominal aortic atherosclerosis (HCC)     DM (diabetes mellitus), type 2, uncontrolled ([address]     BOB (dyspnea

## 2018-12-20 NOTE — VIDEO SWALLOW STUDY NOTE
ADULT VIDEOFLUOROSCOPIC SWALLOWING STUDY    Admission Date: 12/10/2018  Evaluation Date: 12/20/18  Radiologist: Leobardo Aggarwal    RECOMMENDATIONS   Diet Recommendations - Solids: (No solids tested, await surgery clearance)  Diet Recommendations - Liquid:  Thin    F conjunction with Radiologist.  Patient Positioned: Upright;[facility] chair. Patient Viewed: Lateral.  Patient Alertness: Fully alert. Consistencies Presented:  Thin liquids;Puree to assess oropharyngeal swallow function and assess for compensatory strategies to

## 2018-12-20 NOTE — PROGRESS NOTES
Critical Care Progress Note     Assessment / Plan:  1. Septic shock - 2/2 bowel ischemia  - resolved  2. Ischemic bowel s/p emergent surgery  - abx for surgical site infection  - per surgery  3.  Acute hypercapneic respiratory failure - due to baseline rest

## 2018-12-21 NOTE — CM/SW NOTE
Advised in ICU rounds that pt has moved to comfort care this am.  Plan:  / to remain available for support and/or discharge planning.      Brown Barnes, 12/21/18, 12:04 PM

## 2018-12-21 NOTE — CONSULTS
[facility]  Inpatient Wound Care Contact Note    Diana Sorenson Patient Status:  Inpatient    1934 MRN [id number]   [facility] 4SW-A Attending Hudson Faith MD   [facility] Day # 6 PCP Safia Hanks MD     Attempted to see patient

## 2018-12-21 NOTE — PLAN OF CARE
Delirium    • Minimize duration of delirium Not Progressing          GASTROINTESTINAL - ADULT    • Maintains or returns to baseline bowel function Not Progressing    • Maintains adequate nutritional intake (undernourished) Not Progressing        Irritable,

## 2018-12-21 NOTE — PLAN OF CARE
Impaired Swallowing    • Minimize aspiration risk Not Progressing          SKIN/TISSUE INTEGRITY - ADULT    • Incision(s), wounds(s) or drain site(s) healing without S/S of infection Not Progressing          GENITOURINARY - ADULT    • Absence of urinary re

## 2018-12-21 NOTE — PROGRESS NOTES
[id number] [facility] Patient Status:  Inpatient    1934 MRN [id number]   [facility] 4SW-A Attending Gera Washington MD   [facility] Day # 6 PCP Nadine Zamora MD     Critical Care Progress Note     Date of Admission: 12/10/20 17 g, 17 g, Oral, Daily PRN  •  bisacodyl (DULCOLAX) rectal suppository 10 mg, 10 mg, Rectal, Daily PRN  •  metoprolol Tartrate (LOPRESSOR) 5 MG/5ML injection 5 mg, 5 mg, Intravenous, Q6H     OBJECTIVE:  /59 (BP Location: Right arm)   Pulse 103   Tem Recent Labs   Lab  12/19/18   0419  12/20/18   0357  12/21/18   0336   RBC  3.07*  3.14*  3.19*   HGB  9.5*  9.6*  9.7*   HCT  29.2*  29.5*  30.5*   MCV  95.1  93.9  95.6   MCH  30.9  30.6  30.4   MCHC  32.5  32.5  31.8   RDW  15.7  15.4  15.2   NEPREL leaning towards proceeding with comfort measures and coming off the bipap    Critical Care Time greater than: 35 minutes    Latonia Magaña MD  12/21/2018  7:42 AM

## 2018-12-21 NOTE — PROGRESS NOTES
During shift change report pt tricia down to the 40's on jameson. This RN + NOC shift to bedside. Pt found apneic breathing. Pt bagged w/ ambu. Doppler obtained for pulse assessment. Pt w/spontaneous movement though unresponsive at that time.  Cont to bag pt an

## 2018-12-21 NOTE — PROGRESS NOTES
[facility]  Progress Note    Alesha Mcmullen Patient Status:  Inpatient    1934 MRN [id number]   [facility] 4SW-A Attending Juan Fong MD   [facility] Day # 6 PCP Mora Mcghee MD     Subjective:  Patient with an episode of resp both eyes     Coronary artery disease with hx of myocardial infarct w/o hx of CABG     Hx of colon cancer, stage III     HX: breast cancer     Absolute anemia     History of colon cancer     Stasis dermatitis of both legs     Bilateral edema of lower extre without any complaints of pain  Examination-   abdomen is soft and nontender-incision is clean and dry  Assessment/Plan:   POD 11-status post laparotomy for closed loop obstruction  Patient with second respiratory arrest earlier this morning  Patient is de

## 2018-12-21 NOTE — DIETARY NOTE
Nutrition Short Note:    Per rounds pt is now comfort care. Will not be reordering TPN for tonight. RD available PRN.      Raymundo Matute MS, RD, LDN  Pager [phone]

## 2018-12-21 NOTE — SLP NOTE
Note patient transitioning to comfort care. Will sign off at this time.     Ray Collazo Hugh 87 CCC-SLP  Pager [phone]

## 2019-01-20 NOTE — DISCHARGE SUMMARY
[facility]  Discharge Summary    Waqas Paige Patient Status:  Inpatient    1934 MRN [id number]   [facility] 4SW-A Attending No att. providers found   [facility] Day # 11 PCP Lul Simpson MD     Date of Admission: 12/10/2018    Efe

## 2022-02-04 NOTE — PLAN OF CARE
Impaired Activities of Daily Living    • Achieve highest/safest level of independence in self care Progressing        Impaired Functional Mobility    • Achieve highest/safest level of mobility/gait Progressing        Impaired Functional Mobility    • Achie Patient notified  Patient verbalized understanding    Pt scheduled appt with Dr. Lakhani on 3/11    with type 2 diabetes mellitus (HCC)     Smoking     Lung nodules     Compression fracture of body of thoracic vertebra ([address]     Depression     Consult 9/27/15 w/, Closed fracture of upper limb     Thoracic aorta atherosclerosis (HCC)     Aortic

## 2023-05-18 NOTE — LETTER
Radha Lopez [address] [facility] [address] [facility]     PICC LINE INSERTION CONSENT     I agree to have a Peripherally Inserted Central Catheter (PICC) placed in my arm.    1. The PICC insertion procedure, care, maintenance, risks, benefits, and c goals; and potential problems that might occur during recuperation.  They also discussed reasonable alternatives to the PICC, including risks, benefits, and side effects related to the alternatives and risks related to not receiving this procedure      ____ 1 = Total assistance

## (undated) DEVICE — KENDALL SCD EXPRESS SLEEVES, KNEE LENGTH, MEDIUM: Brand: KENDALL SCD

## (undated) DEVICE — BLADE ELECTRODE: Brand: EDGE

## (undated) DEVICE — GOWN,SIRUS,FABRIC-REINFORCED,X-LARGE: Brand: MEDLINE

## (undated) DEVICE — PROXIMATE RELOADABLE LINEAR CUTTER WITH SAFETY LOCK-OUT, 75MM: Brand: PROXIMATE

## (undated) DEVICE — SOL  .9 1000ML BTL

## (undated) DEVICE — Device

## (undated) DEVICE — SUTURE SILK 3-0 SH

## (undated) DEVICE — SUTURE PDS II 1 TP-1

## (undated) DEVICE — SPONGE: SPECIALTY PEANUT XR 100/CS: Brand: MEDICAL ACTION INDUSTRIES

## (undated) DEVICE — SUTURE CHROMIC GUT 3-0 CP-2

## (undated) DEVICE — VIOLET BRAIDED (POLYGLACTIN 910), SYNTHETIC ABSORBABLE SUTURE: Brand: COATED VICRYL

## (undated) DEVICE — SUTURE VICRYL 4-0 SH-1

## (undated) DEVICE — GAMMEX® NON-LATEX PI TEXTURED SIZE 6.5, STERILE POLYISOPRENE POWDER-FREE SURGICAL GLOVE: Brand: GAMMEX

## (undated) DEVICE — REM POLYHESIVE ADULT PATIENT RETURN ELECTRODE: Brand: VALLEYLAB

## (undated) DEVICE — SUTURE SILK 2-0

## (undated) DEVICE — PROXIMATE LINEAR CUTTER RELOAD, BLUE, 75MM: Brand: PROXIMATE

## (undated) DEVICE — TOWEL OR BLU 16X26 STRL

## (undated) DEVICE — PROXIMATE SKIN STAPLERS (35 WIDE) CONTAINS 35 STAINLESS STEEL STAPLES (FIXED HEAD): Brand: PROXIMATE

## (undated) DEVICE — DRAPE EQUIPMENT INTRATEMP THER

## (undated) DEVICE — PROXIMATE RELOADABLE LINEAR STAPLER: Brand: PROXIMATE

## (undated) DEVICE — CHLORAPREP 26ML APPLICATOR

## (undated) DEVICE — SUTURE SILK 0

## (undated) DEVICE — LIGASURE IMPACT OPEN DEVICE

## (undated) DEVICE — LAPAROTOMY CDS: Brand: MEDLINE INDUSTRIES, INC.

## (undated) DEVICE — COVER,MAYO STAND,STERILE: Brand: MEDLINE

## (undated) DEVICE — POOLE SUCTION HANDLE: Brand: CARDINAL HEALTH

## (undated) DEVICE — ABDOMINAL PAD: Brand: DERMACEA

## (undated) DEVICE — SUTURE VICRYL 3-0 SH

## (undated) DEVICE — 3M(TM) MICROPORE TAPE DISPENSER 1535-2: Brand: 3M™ MICROPORE™

## (undated) DEVICE — LAPAROTOMY SPONGE - RF AND X-RAY DETECTABLE PRE-WASHED: Brand: SITUATE

## (undated) NOTE — ED AVS SNAPSHOT
Miriam Chavira   MRN: [id number]    Department:  [facility] Emergency Department   Date of Visit:  10/19/2017           Disclosure     Insurance plans vary and the physician(s) referred by the ER may not be covered by your plan.  Please contact y If you have been prescribed any medication(s), please fill your prescription right away and begin taking the medication(s) as directed    If the emergency physician has read X-rays, these will be re-interpreted by a radiologist.  If there is a significant

## (undated) NOTE — IP AVS SNAPSHOT
Patient Demographics     Address  [facility] [address] [address] Phone  [phone] [facility])  [phone] (Mobile) *Preferred*      Emergency Contact(s)     Name Relation Home Work Julian Son   [phone]    [facility] Daughter [phone] Please check blood pressure daily, keep record and bring to the appointment with Dr. Nidia Zee MD  Please seek medical attention for systolic blood pressure less than 90 mm or greater than 160 mm    Please check blood work (BMP) on 6/27/17 after d ergocalciferol 43833 units Caps  Commonly known as:  DRISDOL/VITAMIN D2      Take 1 capsule by mouth every 7 days.    Morgan Martinez MD         furosemide 20 MG Tabs  Commonly known as:  LASIX      Take 0.5 tablets (10 mg total) by mouth 2 (two) times d These medications were sent to [address], [address] [phone], [phone]  [address] [place] [address]    Phone:  [phone]   furosemide 20 MG Tabs  sodium chloride 1 g Tabs     Please pick [id number] insulin aspart (NOVOLOG) 100 UNIT/ML flexpen 1-5 Units 06/24/17 1252 Given      [id number] insulin detemir (LEVEMIR) 100 UNIT/ML flextouch 5 Units 06/24/17 0851 Given                    Recent Vital Signs    Flowsheet Row Most Recent Value   Tess Blood — 06/24/17 0528          Components    Component Value Reference Range [facility]   WBC 7.5 4.0 - 13.0 x10(3) uL — [facility]   RBC 3.41 3.80 - 5.10 x10(6)uL [facility]   HGB 10.0 12.0 - 16.0 g/dL [facility]   HCT 29.8 34.0 - 50.0 % L [facility] Manuel Moreira  [address] [id number] 02/03/16 1801 - Present            Microbiology Results (All)     Procedure Component Value Units Date/Time    Respiratory Panel FLU expanded Once [[id number]] Diana Sorenson is a 80year old female with PMH significant for CAD s/p PCI, T2DM with associated peripheral neuropathy, breast cancer s/p lumpectomy / radiation, colon cancer s/p resection / chemo/ radiation, HTN, HL, stasis dermatitis presenting to [facility] Social History:   reports that she has been smoking. She has never used smokeless tobacco. She reports that she does not drink alcohol or use illicit drugs.     Allergies:    Pcn [Penicillins]           Comment:Tongue swelling  Sulfa Antibiotics 150-200=2 units, 201-250=4units, 251-300=6units, 301-350=8 units, 351-400=10units ) Disp: 1 pen Rfl: 2    Insulin Glargine (LANTUS SOLOSTAR) 100 UNIT/ML Subcutaneous Solution Pen-injector Inject 11 Units into the skin nightly.    Disp: 3 pen Rfl: 0 Taking Extremities: no calf tenderness, no pedal edema, SCDs bilaterally, hyperpigmentation bilateral shins  Neurologic: no focal neurological deficits  Musculoskeletal: moves all 4 extremities, gait deferred   Psychiatric: appropriate mood and affect        Data Clinical correlation recommended. 3. Multiple stable chronic compression deformities in the spine  A preliminary report was provided by the [facility]iology service.   Dictated by: Jazmyne Nolasco MD on 6/16/2017 at 7:15     Approved by: Jazmyne Nolasco records who is admitted with complaint of sudden onset of vomiting and inability to keep any PO since yesterday. She denies abd pain or cramping and had NG placed on admission for SBO. Her nausea is now improved.     /63 mmHg  Pulse 105  Temp(Src) 9 Hosp Day # 0 PCP Ramesh Rao MD     Cc: vomiting    History of Present Illness:   Soham Mcfadden is a 80year old female with PMH significant for CAD s/p PCI, T2DM with associated peripheral neuropathy, breast cancer s/p lumpectomy / radiation, co LUMPECTOMY RIGHT  1992     Family History   Problem Relation Age of Onset   • Heart Attack Mother      Social History:   reports that she has been smoking.   She has never used smokeless tobacco. She reports that she does not drink alcohol or use illicit d meals. (Patient taking differently: Inject 2 Units into the skin 3 (three) times daily before meals.  SLIDING SCALE    150-200=2 units, 201-250=4units, 251-300=6units, 301-350=8 units, 351-400=10units ) Disp: 1 pen Rfl: 2    Insulin Glargine (LANTUS SOLOSTA Heart:  regular rate and rhythm, no murmur   Abdomen: soft, nontender to palpation,  bowel sounds hypokinetic  Extremities: no calf tenderness, no pedal edema, SCDs bilaterally, hyperpigmentation bilateral shins  Neurologic: no focal neurological deficits pseudocysts or cystic pancreatic neoplasm is not entirely excluded. Clinical correlation recommended. 3. Multiple stable chronic compression deformities in the spine  A preliminary report was provided by the [facility] service.   Dictated by: Maretta Bosworth Consults - MD Consult Notes      Consults signed by Darrian Russell MD at 6/17/2017  5:22 PM     Author:  Darrian Russell MD Service:  ENT Author Type:  Physician    Filed:  6/17/2017  5:22 PM Date of Service:  6/17/2017  4:46 PM Status:  Signed    Surya Neck: No lymphadenopathy, thyromegaly or masses  Neuro: A X O X 3 calm mood CV: (deferred)  Pulm: (deferred)  Procedure: Fiberoptic laryngoscopy The patients nose was anesthetized and decongested with topical lidocaine and oxymetazoline.   After 5 minutes, Location [facility] 5NW-A Attending Marni Rojas MD   [facility] Day # 8 PCP Michael Montiel MD     Date of Admission: 6/16/2017    Date of Discharge: expect 6/24/17    Admitting Diagnosis: Hyponatremia [E87.1]  SBO (small bowel obstruction) ([facility] [L32 Denies chest pain or pressure. Denies any abdominal pain or cramping. States she feels much better since NG placed and she does not feel hungry or have an appetite. Patient states she had an SBO in the fall and symptoms were very similar.      [facility] admit). For now (due to hyponatremia/SIADH) SSRI is held.  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 bleeding     Do not drive when taking pain medications  Do not exceed 4grams acetaminophen within 24 hours            Angelo Coronado  6/24/2017  5:05 PM[ID.1]      Electronically signed by Lianna Smallwood MD on 6/24/2017  5:22 PM   Attribution Key    ID. 1 Medication Administration Recommendations: One pill at a time (present with liquid)      HISTORY   Background/Objective Information:    Problem List  Principal Problem:    SBO (small bowel obstruction) (HCC)  Active Problems:    Nausea and vomiting in adul with concern for aspiration. Family/Patient Goals:  \"I want to drink water\". ASSESSMENT   DYSPHAGIA ASSESSMENT  Test completed in conjunction with Radiologist.  Patient Positioned: Upright;Midline;[facility] chair.   Patient Viewed: Lateral.  Patient A initiation of the pharyngeal swallow. Pharyngeal motility was mildly impaired with mild coating noted in the valleculae and base of tongue due to decreased base of tongue retraction.  Swallow strategy assessed was a double swallow which effectively cleared Admission Date: 6/16/2017  Evaluation Date: 06/23/2017  Radiologist: Dr. Cam Magana    Dear Dr. Hilary Seay,  This letter is to inform you of Raghu Akhtar Videofluoroscopic Swallowing Study results and/or plan of treatment.     PLAN   Diet Recommendations - Solids Precautions: Aspiration  Imaging results:CXR on 6/18/17:  Pulmonary volumes are low. There appears to be chronic fibrotic scarring in the left lung. There is bilateral juxtadiaphragmatic atelectasis.  The patient is osteoporotic with   multiple healed rib f Preparation to swallow, oral acceptance, retrieval, containment, and transit of the boluses were [facility] characterized by timely A-P transit and coordinated bolus transfer, with functional bolus control.  No oral residue noted, patient with functional masticati Pneumococcal (Prevnar 13) 04/28/15

## (undated) NOTE — IP AVS SNAPSHOT
[address]            (For Outpatient Use Only) Initial Admit Date: 6/16/2017   Inpt/Obs Admit Date: Inpt: 6/16/17 / Obs: N/A   Discharge Date:    Hospital Acct:  [de-identified]   MRN: [de-identified]   CSN: [id number]        ENCOUNTER  Patient Hospital Account Financial Class: Medicare Advantage    June 24, 2017

## (undated) NOTE — ED AVS SNAPSHOT
Nettie Alcantar   MRN: [id number]    Department:  [facility] Emergency Department   Date of Visit:  9/22/2017           Disclosure     Insurance plans vary and the physician(s) referred by the ER may not be covered by your plan.  Please contact yo If you have been prescribed any medication(s), please fill your prescription right away and begin taking the medication(s) as directed    If the emergency physician has read X-rays, these will be re-interpreted by a radiologist.  If there is a significant

## (undated) NOTE — LETTER
10/31/17    Patient: Morgan King  : 1934 Visit date: 10/31/2017    Dear  Dr. Varsha Ortiz MD,    Thank you for referring Morgan King to my practice. Please find my assessment and plan below.           Assessment:       The patient is an 83-ye[age] If this is negative, the lower extremity swelling can be treated with elevation and compression stockings. If the ultrasound is positive for DVT the patient will proceed to the emergency department for further management.   There is no surgical interventio